# Patient Record
Sex: MALE | Race: WHITE | NOT HISPANIC OR LATINO | Employment: OTHER | URBAN - METROPOLITAN AREA
[De-identification: names, ages, dates, MRNs, and addresses within clinical notes are randomized per-mention and may not be internally consistent; named-entity substitution may affect disease eponyms.]

---

## 2017-02-09 ENCOUNTER — OFFICE VISIT (OUTPATIENT)
Dept: INTERNAL MEDICINE | Facility: CLINIC | Age: 72
End: 2017-02-09

## 2017-02-09 VITALS
WEIGHT: 152 LBS | RESPIRATION RATE: 16 BRPM | HEART RATE: 72 BPM | SYSTOLIC BLOOD PRESSURE: 152 MMHG | BODY MASS INDEX: 21.81 KG/M2 | DIASTOLIC BLOOD PRESSURE: 74 MMHG

## 2017-02-09 DIAGNOSIS — J06.9 URTI (ACUTE UPPER RESPIRATORY INFECTION): Primary | ICD-10-CM

## 2017-02-09 DIAGNOSIS — L85.3 DRY SKIN: ICD-10-CM

## 2017-02-09 PROCEDURE — 99214 OFFICE O/P EST MOD 30 MIN: CPT | Performed by: INTERNAL MEDICINE

## 2017-02-09 RX ORDER — AZITHROMYCIN 250 MG/1
TABLET, FILM COATED ORAL
Qty: 6 TABLET | Refills: 0 | Status: SHIPPED | OUTPATIENT
Start: 2017-02-09 | End: 2017-09-14

## 2017-02-09 NOTE — PROGRESS NOTES
Subjective   Kory Mcmanus is a 71 y.o. male.   Pt presents today with complaints of nasal congestion and nonproductive cough.             History of Present Illness   Pt presents today with nasal congestion and complaints of nonproductive cough. He states he has been fighting these symptoms since the end of December 2016. He states that his BP is controlled usually. He denies fever or chills.   He also complains o dry itching skin as he has done it appears at past visits. He states he washes his body with the same soap he washes his hair with?             The following portions of the patient's history were reviewed and updated as appropriate: allergies, current medications, past family history, past medical history, past social history, past surgical history and problem list.               Review of Systems   Constitutional: Negative.    HENT:        See HPI.    Eyes: Negative.    Respiratory:        See HPI.    Cardiovascular: Negative.    Gastrointestinal: Negative.    Endocrine: Negative.    Genitourinary: Negative.    Musculoskeletal: Negative.    Skin: Negative.    Allergic/Immunologic: Negative.    Neurological: Negative.    Hematological: Negative.    Psychiatric/Behavioral: Negative.                 Objective   Physical Exam   Constitutional: He is oriented to person, place, and time. He appears well-developed and well-nourished.   HENT:   Head: Normocephalic and atraumatic.   Right Ear: External ear normal.   Left Ear: External ear normal.   Nose: Nose normal.   Moderate oropharyngeal drainage.   Tenderness to palpation of maxillary sinuses.    Eyes: Conjunctivae and EOM are normal. Pupils are equal, round, and reactive to light.   Neck: Normal range of motion. Neck supple.   Cardiovascular: Normal rate, regular rhythm and normal heart sounds.    Pulmonary/Chest: Effort normal and breath sounds normal.   Abdominal: Soft. Bowel sounds are normal.   Neurological: He is alert and oriented to person, place, and  time. He has normal reflexes.   Skin: Skin is warm and dry.   Psychiatric: He has a normal mood and affect.   Nursing note and vitals reviewed.               Assessment/Plan      URTI (acute upper respiratory infection)  -     azithromycin (ZITHROMAX Z-INDIA) 250 MG tablet; Take 2 tablets the first day, then 1 tablet daily for 4 days.    Dry skin        1.) Nasal saline spray as directed   2.) Zpack as directed   3.) Also recommended Dove soap for sensitive skin to wash with and Cereve lotion prn, I did state it could be due to his anxiety as well.   4.) 15 minutes spent in exam and 10 minutes spent counseling on new medications how to dose and possible s/e.     * Total time spent in discussion and exam 25 minutes.

## 2017-03-13 ENCOUNTER — OFFICE VISIT (OUTPATIENT)
Dept: INTERNAL MEDICINE | Facility: CLINIC | Age: 72
End: 2017-03-13

## 2017-03-13 VITALS
HEART RATE: 68 BPM | BODY MASS INDEX: 21.81 KG/M2 | WEIGHT: 152 LBS | RESPIRATION RATE: 17 BRPM | DIASTOLIC BLOOD PRESSURE: 70 MMHG | SYSTOLIC BLOOD PRESSURE: 152 MMHG

## 2017-03-13 DIAGNOSIS — F41.1 GENERALIZED ANXIETY DISORDER: ICD-10-CM

## 2017-03-13 DIAGNOSIS — R05.9 COUGH: ICD-10-CM

## 2017-03-13 DIAGNOSIS — E78.2 MIXED HYPERLIPIDEMIA: Primary | ICD-10-CM

## 2017-03-13 DIAGNOSIS — Z79.899 DRUG THERAPY: ICD-10-CM

## 2017-03-13 DIAGNOSIS — I10 ESSENTIAL HYPERTENSION: ICD-10-CM

## 2017-03-13 DIAGNOSIS — N40.0 BENIGN NON-NODULAR PROSTATIC HYPERPLASIA WITHOUT LOWER URINARY TRACT SYMPTOMS: ICD-10-CM

## 2017-03-13 PROCEDURE — 99397 PER PM REEVAL EST PAT 65+ YR: CPT | Performed by: INTERNAL MEDICINE

## 2017-03-13 NOTE — PROGRESS NOTES
Subjective   Kory Mcmanus is a 72 y.o. male.     History of Present Illness   For annual physical and follow up of  Mild hyperlipidemia on no medication.  Anxiety is about the same.  Still has some cough and congestion which is aggravated by his daughters smoking.  HTN borderline on no meds.    The following portions of the patient's history were reviewed and updated as appropriate: allergies, current medications, past family history, past medical history, past social history, past surgical history and problem list.    Review of Systems   Constitutional: Negative for activity change, appetite change, fatigue and fever.   HENT: Negative for dental problem, ear pain, hearing loss, postnasal drip, rhinorrhea, sinus pressure, sore throat, trouble swallowing and voice change.    Eyes: Negative.    Respiratory: Positive for cough. Negative for chest tightness, shortness of breath and wheezing.    Cardiovascular: Negative for chest pain and leg swelling.   Gastrointestinal: Negative for abdominal distention, abdominal pain, blood in stool, constipation, diarrhea and nausea.   Endocrine: Negative for polydipsia and polyuria.   Genitourinary: Negative for decreased urine volume, dysuria, hematuria and urgency.   Musculoskeletal: Negative for arthralgias, back pain and neck pain.   Skin: Negative for pallor and rash.   Allergic/Immunologic: Negative.    Neurological: Negative for dizziness, tremors, speech difficulty, weakness, numbness and headaches.   Hematological: Negative for adenopathy.   Psychiatric/Behavioral: Negative for agitation, behavioral problems, confusion, dysphoric mood and sleep disturbance. The patient is nervous/anxious. The patient is not hyperactive.        Objective   Physical Exam   Constitutional: He is oriented to person, place, and time. He appears well-developed and well-nourished.   BP by me, 132/80.   HENT:   Head: Normocephalic and atraumatic.   Right Ear: External ear normal.   Left Ear:  External ear normal.   Nose: Nose normal.   Mouth/Throat: Oropharynx is clear and moist.   Eyes: Conjunctivae and EOM are normal. Pupils are equal, round, and reactive to light.   Fundoscopic exam:       The right eye shows no AV nicking and no hemorrhage.        The left eye shows no AV nicking and no hemorrhage.   Neck: Normal range of motion. Neck supple. No thyromegaly present.   Cardiovascular: Normal rate, regular rhythm, normal heart sounds and intact distal pulses.    No murmur heard.  Carotids normal.   Pulmonary/Chest: Effort normal and breath sounds normal.   Abdominal: Soft. Bowel sounds are normal. He exhibits no distension and no mass. There is no tenderness. No hernia.   Genitourinary: Rectum normal, testes normal and penis normal.   Genitourinary Comments: Prostate is 3+ and smooth.   Musculoskeletal: Normal range of motion.   Lymphadenopathy:     He has no cervical adenopathy.   Neurological: He is alert and oriented to person, place, and time. He has normal reflexes. No cranial nerve deficit.   Skin: Skin is warm and dry.   Psychiatric: He has a normal mood and affect. His behavior is normal. Judgment and thought content normal.   Nursing note and vitals reviewed.      Assessment/Plan   Kory was seen today for hypertension.    Diagnoses and all orders for this visit:    Mixed hyperlipidemia  -     Comprehensive Metabolic Panel; Future  -     Lipid Panel; Future    Essential hypertension    Generalized anxiety disorder    Benign non-nodular prostatic hyperplasia without lower urinary tract symptoms  -     PSA; Future    Drug therapy  -     CBC (No Diff); Future    Cough    Cough is probably multifactorial, from smoke and anxiety.]  Recommended otc mucinex.  Rest of problems are stable.  Counseled on diet and immunizations.  Health maintenance is up to date.  Recheck here 6 months.

## 2017-03-14 ENCOUNTER — TELEPHONE (OUTPATIENT)
Dept: INTERNAL MEDICINE | Facility: CLINIC | Age: 72
End: 2017-03-14

## 2017-03-14 NOTE — TELEPHONE ENCOUNTER
----- Message from June Osborne sent at 3/14/2017 11:58 AM EDT -----  [phone]  PT WAS IN YESTERDAY FOR A PHYSICAL AND PT HAS QUESTION ABOUT GOING ON ANTIBIOTIC   [facility]

## 2017-03-15 LAB
ALBUMIN SERPL-MCNC: 4.3 G/DL (ref 3.2–4.8)
ALBUMIN/GLOB SERPL: 1.3 G/DL (ref 1.5–2.5)
ALP SERPL-CCNC: 81 U/L (ref 25–100)
ALT SERPL-CCNC: 34 U/L (ref 7–40)
AST SERPL-CCNC: 30 U/L (ref 0–33)
BILIRUB SERPL-MCNC: 0.6 MG/DL (ref 0.3–1.2)
BUN SERPL-MCNC: 19 MG/DL (ref 9–23)
BUN/CREAT SERPL: 23.8 (ref 7–25)
CALCIUM SERPL-MCNC: 10.1 MG/DL (ref 8.7–10.4)
CHLORIDE SERPL-SCNC: 101 MMOL/L (ref 99–109)
CHOLEST SERPL-MCNC: 207 MG/DL (ref 0–200)
CO2 SERPL-SCNC: 26 MMOL/L (ref 20–31)
CREAT SERPL-MCNC: 0.8 MG/DL (ref 0.6–1.3)
ERYTHROCYTE [DISTWIDTH] IN BLOOD BY AUTOMATED COUNT: 13.8 % (ref 11.3–14.5)
GLOBULIN SER CALC-MCNC: 3.3 GM/DL
GLUCOSE SERPL-MCNC: 95 MG/DL (ref 70–100)
HCT VFR BLD AUTO: 43 % (ref 38.9–50.9)
HDLC SERPL-MCNC: 64 MG/DL (ref 40–60)
HGB BLD-MCNC: 14.3 G/DL (ref 13.1–17.5)
LDLC SERPL CALC-MCNC: 115 MG/DL (ref 0–100)
MCH RBC QN AUTO: 32 PG (ref 27–31)
MCHC RBC AUTO-ENTMCNC: 33.3 G/DL (ref 32–36)
MCV RBC AUTO: 96.2 FL (ref 80–99)
PLATELET # BLD AUTO: 310 10*3/MM3 (ref 150–450)
POTASSIUM SERPL-SCNC: 4 MMOL/L (ref 3.5–5.5)
PROT SERPL-MCNC: 7.6 G/DL (ref 5.7–8.2)
PSA SERPL-MCNC: 1.5 NG/ML (ref 0–4)
RBC # BLD AUTO: 4.47 10*6/MM3 (ref 4.2–5.76)
SODIUM SERPL-SCNC: 140 MMOL/L (ref 132–146)
TRIGL SERPL-MCNC: 140 MG/DL (ref 0–150)
VLDLC SERPL CALC-MCNC: 28 MG/DL
WBC # BLD AUTO: 5.13 10*3/MM3 (ref 3.5–10.8)

## 2017-03-17 NOTE — TELEPHONE ENCOUNTER
Patient returned our call and stated that he went to the dermatologist the following day received a steroid injection. States he decided at this time he did not want to try an antibiotic for his cough. Advised patient that he can return our call if symptoms persist. Patient verbalized understanding

## 2017-03-17 NOTE — TELEPHONE ENCOUNTER
Attempted to contact patient LVM for him to return call stating if he was still having concerns. Office number and hours given

## 2017-09-14 ENCOUNTER — OFFICE VISIT (OUTPATIENT)
Dept: INTERNAL MEDICINE | Facility: CLINIC | Age: 72
End: 2017-09-14

## 2017-09-14 VITALS
RESPIRATION RATE: 21 BRPM | HEART RATE: 70 BPM | WEIGHT: 153 LBS | BODY MASS INDEX: 21.9 KG/M2 | HEIGHT: 70 IN | DIASTOLIC BLOOD PRESSURE: 74 MMHG | SYSTOLIC BLOOD PRESSURE: 152 MMHG

## 2017-09-14 DIAGNOSIS — E78.2 MIXED HYPERLIPIDEMIA: ICD-10-CM

## 2017-09-14 DIAGNOSIS — I10 ESSENTIAL HYPERTENSION: Primary | ICD-10-CM

## 2017-09-14 DIAGNOSIS — N40.0 BENIGN NON-NODULAR PROSTATIC HYPERPLASIA WITHOUT LOWER URINARY TRACT SYMPTOMS: ICD-10-CM

## 2017-09-14 PROCEDURE — 99214 OFFICE O/P EST MOD 30 MIN: CPT | Performed by: INTERNAL MEDICINE

## 2017-09-14 RX ORDER — TAMSULOSIN HYDROCHLORIDE 0.4 MG/1
1 CAPSULE ORAL NIGHTLY
Qty: 30 CAPSULE | Refills: 5 | Status: SHIPPED | OUTPATIENT
Start: 2017-09-14 | End: 2018-03-27

## 2017-09-14 NOTE — PROGRESS NOTES
Subjective   Kory Mcmanus is a 72 y.o. male.     History of Present Illness   For follow up of  Borderline hypertension on no meds. No chest pain, sob or headaches.  BPH which bothers him a lot at night,  He is ready to try medication.  Hyperlipidemia on no meds.  He has some congestion when he lays down at night, but if he rolls over it goes away.    The following portions of the patient's history were reviewed and updated as appropriate: allergies, current medications, past medical history and problem list.    Review of Systems   Constitutional: Negative.    Respiratory: Negative.  Negative for cough, choking, chest tightness, shortness of breath and wheezing.    Cardiovascular: Negative.  Negative for chest pain, palpitations and leg swelling.   Gastrointestinal: Negative.  Negative for abdominal distention and abdominal pain.   Genitourinary: Positive for frequency.       Objective   Physical Exam   Constitutional: He appears well-developed and well-nourished.   /80 by me.     Neck: Normal range of motion. Neck supple.   Cardiovascular: Normal rate, regular rhythm, normal heart sounds and intact distal pulses.  Exam reveals no gallop and no friction rub.    No murmur heard.  Pulmonary/Chest: Effort normal and breath sounds normal. No respiratory distress. He has no wheezes. He has no rales. He exhibits no tenderness.   Abdominal: Soft. Bowel sounds are normal. He exhibits no distension and no mass. There is no tenderness.   Nursing note and vitals reviewed.      Assessment/Plan   Kory was seen today for hypertension.    Diagnoses and all orders for this visit:    Essential hypertension    Mixed hyperlipidemia    Benign non-nodular prostatic hyperplasia without lower urinary tract symptoms  -     tamsulosin (FLOMAX) 0.4 MG capsule 24 hr capsule; Take 1 capsule by mouth Every Night.    All problems are stable.  Will try flomax.  He is not interested in blood pressure med.  Continue same.   Recheck 6  months.

## 2018-03-27 ENCOUNTER — OFFICE VISIT (OUTPATIENT)
Dept: INTERNAL MEDICINE | Facility: CLINIC | Age: 73
End: 2018-03-27

## 2018-03-27 VITALS
SYSTOLIC BLOOD PRESSURE: 144 MMHG | HEIGHT: 70 IN | WEIGHT: 157 LBS | RESPIRATION RATE: 21 BRPM | DIASTOLIC BLOOD PRESSURE: 82 MMHG | HEART RATE: 86 BPM | BODY MASS INDEX: 22.48 KG/M2

## 2018-03-27 DIAGNOSIS — J41.0 CHRONIC BRONCHITIS, SIMPLE (HCC): Primary | ICD-10-CM

## 2018-03-27 DIAGNOSIS — R05.9 COUGH: ICD-10-CM

## 2018-03-27 DIAGNOSIS — E78.2 MIXED HYPERLIPIDEMIA: ICD-10-CM

## 2018-03-27 DIAGNOSIS — Z79.899 DRUG THERAPY: ICD-10-CM

## 2018-03-27 DIAGNOSIS — I10 ESSENTIAL HYPERTENSION: ICD-10-CM

## 2018-03-27 DIAGNOSIS — F41.1 GENERALIZED ANXIETY DISORDER: ICD-10-CM

## 2018-03-27 DIAGNOSIS — N40.0 BENIGN NON-NODULAR PROSTATIC HYPERPLASIA WITHOUT LOWER URINARY TRACT SYMPTOMS: ICD-10-CM

## 2018-03-27 PROCEDURE — 99397 PER PM REEVAL EST PAT 65+ YR: CPT | Performed by: INTERNAL MEDICINE

## 2018-03-27 RX ORDER — MONTELUKAST SODIUM 10 MG/1
10 TABLET ORAL NIGHTLY
Qty: 30 TABLET | Refills: 3 | Status: SHIPPED | OUTPATIENT
Start: 2018-03-27 | End: 2019-04-22

## 2018-03-27 NOTE — PROGRESS NOTES
Subjective   Kory Mcmanus is a 73 y.o. male.     History of Present Illness   For annual physical and follow up of  BPH.  Has to get up a lot at night to urinated but he goes to sleep right after he drinks after dinner.  Is not taking flomax.  Some cough and congestion with his chronic bronchitis.  Anxiety is about the same.  Borderline blood pressure, no headaches, chest pain or sob.    The following portions of the patient's history were reviewed and updated as appropriate: allergies, current medications, past family history, past medical history, past social history, past surgical history and problem list.    Review of Systems   Constitutional: Negative.  Negative for activity change, appetite change, fatigue and fever.   HENT: Positive for congestion. Negative for dental problem, ear pain, hearing loss, postnasal drip, rhinorrhea, sinus pressure, sore throat, trouble swallowing and voice change.    Eyes: Negative.  Negative for redness and visual disturbance.   Respiratory: Positive for cough. Negative for chest tightness and wheezing.    Cardiovascular: Negative.  Negative for chest pain, palpitations and leg swelling.   Gastrointestinal: Negative.  Negative for abdominal distention, abdominal pain, blood in stool, constipation, diarrhea and nausea.   Endocrine: Negative.  Negative for polydipsia and polyuria.   Genitourinary: Positive for frequency. Negative for decreased urine volume, dysuria, hematuria and urgency.   Musculoskeletal: Negative.  Negative for arthralgias, back pain and neck pain.   Skin: Negative.  Negative for pallor and rash.   Allergic/Immunologic: Negative.    Neurological: Negative.  Negative for dizziness, tremors, speech difficulty, weakness, numbness and headaches.   Hematological: Negative.  Negative for adenopathy.   Psychiatric/Behavioral: Negative for agitation, behavioral problems, confusion, dysphoric mood and sleep disturbance. The patient is nervous/anxious. The patient is not  hyperactive.        Objective   Physical Exam   Constitutional: He is oriented to person, place, and time. He appears well-developed and well-nourished.   HENT:   Head: Normocephalic and atraumatic.   Right Ear: External ear normal.   Left Ear: External ear normal.   Nose: Nose normal.   Mouth/Throat: Oropharynx is clear and moist.   Eyes: Conjunctivae and EOM are normal. Pupils are equal, round, and reactive to light.   Fundoscopic exam:       The right eye shows no AV nicking and no hemorrhage.        The left eye shows no AV nicking and no hemorrhage.   Neck: Normal range of motion. Neck supple. No thyromegaly present.   Cardiovascular: Normal rate, regular rhythm, normal heart sounds and intact distal pulses.  Exam reveals no gallop and no friction rub.    No murmur heard.  Carotids normal.   Pulmonary/Chest: Effort normal and breath sounds normal. No respiratory distress. He has no wheezes. He has no rales. He exhibits no tenderness.   Abdominal: Soft. Bowel sounds are normal. He exhibits no distension and no mass. There is no tenderness. No hernia.   Genitourinary: Rectum normal, testes normal and penis normal.   Genitourinary Comments: Prostate 3+ but smooth.   Musculoskeletal: Normal range of motion. He exhibits no edema.   Lymphadenopathy:     He has no cervical adenopathy.   Neurological: He is alert and oriented to person, place, and time. He has normal reflexes. No cranial nerve deficit.   Skin: Skin is warm and dry.   A lot of cracking on distal fingers.   Psychiatric: He has a normal mood and affect. His behavior is normal. Judgment and thought content normal.   Nursing note and vitals reviewed.      Assessment/Plan   Kory was seen today for essential hypertension.    Diagnoses and all orders for this visit:    Chronic bronchitis, simple  -     montelukast (SINGULAIR) 10 MG tablet; Take 1 tablet by mouth Every Night.    Mixed hyperlipidemia  -     Comprehensive Metabolic Panel; Future  -     Lipid  Panel; Future    Essential hypertension    Cough    Generalized anxiety disorder    Benign non-nodular prostatic hyperplasia without lower urinary tract symptoms  -     PSA Screen; Future    Drug therapy  -     CBC (No Diff); Future    Will try singulair.  He will try daily flomax again.  BP is borderline, he will watch his salt better.  Rest of problems are stable.  Labs as noted.  Counseled on diet and immunizations.  Health maintenance is up to date.  Same meds otherwise.  Recheck 6 months.

## 2018-04-12 ENCOUNTER — TELEPHONE (OUTPATIENT)
Dept: INTERNAL MEDICINE | Facility: CLINIC | Age: 73
End: 2018-04-12

## 2018-04-12 NOTE — TELEPHONE ENCOUNTER
4-12-18  S/w patient informed him of his blood work he stated will be in the next day or two to have drawn.  Very appreciative of the reminder.

## 2018-04-13 ENCOUNTER — LAB (OUTPATIENT)
Dept: INTERNAL MEDICINE | Facility: CLINIC | Age: 73
End: 2018-04-13

## 2018-04-13 DIAGNOSIS — Z79.899 DRUG THERAPY: ICD-10-CM

## 2018-04-13 DIAGNOSIS — E78.2 MIXED HYPERLIPIDEMIA: ICD-10-CM

## 2018-04-13 DIAGNOSIS — N40.0 BENIGN NON-NODULAR PROSTATIC HYPERPLASIA WITHOUT LOWER URINARY TRACT SYMPTOMS: ICD-10-CM

## 2018-04-13 LAB
ALBUMIN SERPL-MCNC: 4.4 G/DL (ref 3.2–4.8)
ALBUMIN/GLOB SERPL: 1.4 G/DL (ref 1.5–2.5)
ALP SERPL-CCNC: 94 U/L (ref 25–100)
ALT SERPL-CCNC: 36 U/L (ref 7–40)
AST SERPL-CCNC: 29 U/L (ref 0–33)
BILIRUB SERPL-MCNC: 0.8 MG/DL (ref 0.3–1.2)
BUN SERPL-MCNC: 21 MG/DL (ref 9–23)
BUN/CREAT SERPL: 26.3 (ref 7–25)
CALCIUM SERPL-MCNC: 9.5 MG/DL (ref 8.7–10.4)
CHLORIDE SERPL-SCNC: 100 MMOL/L (ref 99–109)
CHOLEST SERPL-MCNC: 189 MG/DL (ref 0–200)
CO2 SERPL-SCNC: 31 MMOL/L (ref 20–31)
CREAT SERPL-MCNC: 0.8 MG/DL (ref 0.6–1.3)
ERYTHROCYTE [DISTWIDTH] IN BLOOD BY AUTOMATED COUNT: 13.8 % (ref 11.3–14.5)
GFR SERPLBLD CREATININE-BSD FMLA CKD-EPI: 115 ML/MIN/1.73
GFR SERPLBLD CREATININE-BSD FMLA CKD-EPI: 94 ML/MIN/1.73
GLOBULIN SER CALC-MCNC: 3.1 GM/DL
GLUCOSE SERPL-MCNC: 99 MG/DL (ref 70–100)
HCT VFR BLD AUTO: 44.5 % (ref 38.9–50.9)
HDLC SERPL-MCNC: 63 MG/DL (ref 40–60)
HGB BLD-MCNC: 14.6 G/DL (ref 13.1–17.5)
LDLC SERPL CALC-MCNC: 110 MG/DL (ref 0–100)
MCH RBC QN AUTO: 31.9 PG (ref 27–31)
MCHC RBC AUTO-ENTMCNC: 32.8 G/DL (ref 32–36)
MCV RBC AUTO: 97.2 FL (ref 80–99)
PLATELET # BLD AUTO: 346 10*3/MM3 (ref 150–450)
POTASSIUM SERPL-SCNC: 4.4 MMOL/L (ref 3.5–5.5)
PROT SERPL-MCNC: 7.5 G/DL (ref 5.7–8.2)
PSA SERPL-MCNC: 1.75 NG/ML (ref 0–4)
RBC # BLD AUTO: 4.58 10*6/MM3 (ref 4.2–5.76)
SODIUM SERPL-SCNC: 138 MMOL/L (ref 132–146)
TRIGL SERPL-MCNC: 82 MG/DL (ref 0–150)
VLDLC SERPL CALC-MCNC: 16.4 MG/DL
WBC # BLD AUTO: 8.37 10*3/MM3 (ref 3.5–10.8)

## 2018-04-13 PROCEDURE — 36415 COLL VENOUS BLD VENIPUNCTURE: CPT | Performed by: INTERNAL MEDICINE

## 2018-06-01 ENCOUNTER — OFFICE VISIT (OUTPATIENT)
Dept: INTERNAL MEDICINE | Facility: CLINIC | Age: 73
End: 2018-06-01

## 2018-06-01 VITALS
DIASTOLIC BLOOD PRESSURE: 80 MMHG | OXYGEN SATURATION: 98 % | BODY MASS INDEX: 22.48 KG/M2 | TEMPERATURE: 97.8 F | RESPIRATION RATE: 16 BRPM | WEIGHT: 157 LBS | SYSTOLIC BLOOD PRESSURE: 140 MMHG | HEART RATE: 81 BPM | HEIGHT: 70 IN

## 2018-06-01 DIAGNOSIS — H00.015 HORDEOLUM EXTERNUM LEFT LOWER EYELID: Primary | ICD-10-CM

## 2018-06-01 DIAGNOSIS — H01.001 BLEPHARITIS OF UPPER EYELIDS OF BOTH EYES, UNSPECIFIED TYPE: ICD-10-CM

## 2018-06-01 DIAGNOSIS — H01.004 BLEPHARITIS OF UPPER EYELIDS OF BOTH EYES, UNSPECIFIED TYPE: ICD-10-CM

## 2018-06-01 PROCEDURE — 99213 OFFICE O/P EST LOW 20 MIN: CPT | Performed by: PHYSICIAN ASSISTANT

## 2018-06-01 NOTE — PROGRESS NOTES
Chief Complaint   Patient presents with   • Stye     L eye pain, 3 days ago, eye lid started swelling. R eye starting to get a little achey.       Subjective       History of Present Illness     Kory Mcmanus is a 73 y.o. male. He presents with 3 day history of pain beneath his left eye and some swelling around the eye. Now having some symptoms in R eye as well. Patient states that this began with a little soreness under left eye and felt like his eyelid was drooping a little because of some swelling. He noticed some dry skin above eye as well. Patient feels like right eye is having some similar symptoms with drooping of eyelid beginning yesterday. Patient denies any eye involvement, only skin around eyes/ eyelids. He denies blurry vision or spot in his vision, itchy or watery eyes, eye redness, nasal congestion or runny nose, ear pain, sore throat, headache, rash or any additional symptoms. Patient did use warm compress on L eye last night and this appeared to improve his symptoms. He also uses OTC eye drops for dry eyes and to remove particulates PRN after working. Eye drops have provided no relief. Patient does note that he uses same towel to wipe his face/ eyes throughout the day when working, often for days/ week at a time which may be exacerbating symptoms.       The following portions of the patient's history were reviewed and updated as appropriate: allergies, current medications, past medical history, past social history and problem list.    Allergies   Allergen Reactions   • Amoxicillin    • Clindamycin      Social History   Substance Use Topics   • Smoking status: Former Smoker   • Smokeless tobacco: Not on file   • Alcohol use Yes      Comment: 1-2 drinks daily         Current Outpatient Prescriptions:   •  aspirin (BAILEE ADVANCED ASPIRIN REG ST) 325 MG tablet, Take 1 tablet by mouth 3 (three) times a day., Disp: , Rfl:   •  montelukast (SINGULAIR) 10 MG tablet, Take 1 tablet by mouth Every Night., Disp: 30  tablet, Rfl: 3    Review of Systems   Constitutional: Negative for chills, fatigue and fever.   HENT: Negative for congestion, ear pain and sore throat.    Eyes: Negative for blurred vision, pain, discharge, redness and itching.        +pain below left eye; +swelling of eyelids bilaterally   Respiratory: Negative for cough, shortness of breath and wheezing.    Cardiovascular: Negative for chest pain and palpitations.   Gastrointestinal: Negative for abdominal pain, diarrhea, nausea and vomiting.   Genitourinary: Negative for dysuria and hematuria.   Skin: Negative for rash.   Neurological: Negative for dizziness, weakness, light-headedness and headache.       Objective   Vitals:    06/01/18 1533   BP: 140/80   Pulse: 81   Resp: 16   Temp: 97.8 °F (36.6 °C)   SpO2: 98%     Physical Exam   Constitutional: He appears well-developed and well-nourished.   HENT:   Head: Normocephalic and atraumatic.   Right Ear: Tympanic membrane, external ear and ear canal normal.   Left Ear: Tympanic membrane, external ear and ear canal normal.   Nose: Nose normal.   Mouth/Throat: Oropharynx is clear and moist and mucous membranes are normal.   Eyes: Conjunctivae and EOM are normal. Pupils are equal, round, and reactive to light. Right eye exhibits no discharge. No foreign body present in the right eye. Left eye exhibits hordeolum. Left eye exhibits no discharge. No foreign body present in the left eye. No scleral icterus.   Fundoscopic exam:       The right eye shows no hemorrhage.        The left eye shows no hemorrhage.   +blepharitis of eyelids bilaterally, L > R. +minimal swelling and erythema of L lower eyelid slightly lateral to midline indicative of early stye. +tenderness to palpation of L lower eyelid. +minimal  erythema and edema of R upper eyelid noted. No tenderness to palpation of R orbit or R lower lid.    Cardiovascular: Normal rate and regular rhythm.    Pulmonary/Chest: Effort normal and breath sounds normal.    Lymphadenopathy:     He has no cervical adenopathy.       Assessment/Plan   Kory was seen today for stye.    Diagnoses and all orders for this visit:    Hordeolum externum left lower eyelid    Blepharitis of upper eyelids of both eyes, unspecified type    Patient advised to use warm compress on both eyes 2 times a day at least for the next week, or until symptoms resolve. Also use Brijesh's baby shampoo or other no-tear shampoo formula on soft cloth to gently cleanse eyelids and around eyes at least once daily.   Good eye/ eyelid hygiene encouraged. Do not reuse towels and wash facial towels often.          Return if symptoms worsen or fail to improve.

## 2018-09-27 ENCOUNTER — OFFICE VISIT (OUTPATIENT)
Dept: INTERNAL MEDICINE | Facility: CLINIC | Age: 73
End: 2018-09-27

## 2018-09-27 VITALS
RESPIRATION RATE: 16 BRPM | BODY MASS INDEX: 22.67 KG/M2 | TEMPERATURE: 97.8 F | WEIGHT: 158 LBS | OXYGEN SATURATION: 98 % | DIASTOLIC BLOOD PRESSURE: 82 MMHG | HEART RATE: 83 BPM | SYSTOLIC BLOOD PRESSURE: 140 MMHG

## 2018-09-27 DIAGNOSIS — E78.2 MIXED HYPERLIPIDEMIA: ICD-10-CM

## 2018-09-27 DIAGNOSIS — J41.0 CHRONIC BRONCHITIS, SIMPLE (HCC): ICD-10-CM

## 2018-09-27 DIAGNOSIS — I10 ESSENTIAL HYPERTENSION: Primary | ICD-10-CM

## 2018-09-27 DIAGNOSIS — F41.1 GENERALIZED ANXIETY DISORDER: ICD-10-CM

## 2018-09-27 DIAGNOSIS — Z91.89 AT RISK FOR HEPATITIS: ICD-10-CM

## 2018-09-27 PROCEDURE — 99214 OFFICE O/P EST MOD 30 MIN: CPT | Performed by: INTERNAL MEDICINE

## 2018-09-27 PROCEDURE — 90471 IMMUNIZATION ADMIN: CPT | Performed by: INTERNAL MEDICINE

## 2018-09-27 PROCEDURE — 90632 HEPA VACCINE ADULT IM: CPT | Performed by: INTERNAL MEDICINE

## 2018-09-27 NOTE — PROGRESS NOTES
Subjective   Kory Mcmanus is a 73 y.o. male.     History of Present Illness   For follow up of  Borderline hypertension on no meds, no chest pain sob or headaches.  Hyperlipidemia on no meds.  Anxiety is stable.  Mild chronic bronchitis is good on singulair.    The following portions of the patient's history were reviewed and updated as appropriate: allergies, current medications, past medical history and problem list.    Review of Systems   Constitutional: Negative.    Respiratory: Negative.  Negative for cough, chest tightness, shortness of breath and wheezing.    Cardiovascular: Negative.  Negative for chest pain, palpitations and leg swelling.   Gastrointestinal: Negative.  Negative for abdominal distention and abdominal pain.       Objective   Physical Exam   Constitutional: He appears well-developed and well-nourished.   135/80 by me.   Cardiovascular: Normal rate, regular rhythm and normal heart sounds.  Exam reveals no gallop and no friction rub.    No murmur heard.  Pulmonary/Chest: Effort normal and breath sounds normal. No respiratory distress. He has no wheezes. He has no rales. He exhibits no tenderness.   Abdominal: Soft. Bowel sounds are normal. He exhibits no distension and no mass. There is no tenderness. There is no guarding.   Neurological: He is alert.   Nursing note and vitals reviewed.        Assessment/Plan   Kory was seen today for hypertension.    Diagnoses and all orders for this visit:    Essential hypertension    Mixed hyperlipidemia    At risk for hepatitis  -     Hepatitis A Vaccine Adult IM    Generalized anxiety disorder    Chronic bronchitis, simple (CMS/HCC)    All problems are stable.  Same meds.  Recheck 6 months.

## 2018-10-05 ENCOUNTER — TELEPHONE (OUTPATIENT)
Dept: INTERNAL MEDICINE | Facility: CLINIC | Age: 73
End: 2018-10-05

## 2018-10-05 NOTE — TELEPHONE ENCOUNTER
----- Message from Adrienne Loo sent at 10/4/2018  2:27 PM EDT -----  PATIENT CALLED AND IS REQUESTING A CALL BACK. PATIENT WOULD NOT BE SPECIFIC REQUESTED TO SPEAK ONLY TO DR. KENNEY OR HIS NURSE.    PATIENT CONTACT: [phone]    THANK YOU.

## 2018-10-12 ENCOUNTER — TELEPHONE (OUTPATIENT)
Dept: INTERNAL MEDICINE | Facility: CLINIC | Age: 73
End: 2018-10-12

## 2019-04-22 ENCOUNTER — OFFICE VISIT (OUTPATIENT)
Dept: INTERNAL MEDICINE | Facility: CLINIC | Age: 74
End: 2019-04-22

## 2019-04-22 VITALS
WEIGHT: 158 LBS | HEART RATE: 82 BPM | SYSTOLIC BLOOD PRESSURE: 158 MMHG | RESPIRATION RATE: 21 BRPM | DIASTOLIC BLOOD PRESSURE: 72 MMHG | BODY MASS INDEX: 22.62 KG/M2 | HEIGHT: 70 IN

## 2019-04-22 DIAGNOSIS — F41.1 GENERALIZED ANXIETY DISORDER: ICD-10-CM

## 2019-04-22 DIAGNOSIS — Z00.00 HEALTH MAINTENANCE EXAMINATION: ICD-10-CM

## 2019-04-22 DIAGNOSIS — I10 ESSENTIAL HYPERTENSION: Primary | ICD-10-CM

## 2019-04-22 DIAGNOSIS — N40.0 BENIGN NON-NODULAR PROSTATIC HYPERPLASIA WITHOUT LOWER URINARY TRACT SYMPTOMS: ICD-10-CM

## 2019-04-22 DIAGNOSIS — E78.2 MIXED HYPERLIPIDEMIA: ICD-10-CM

## 2019-04-22 PROCEDURE — 99397 PER PM REEVAL EST PAT 65+ YR: CPT | Performed by: INTERNAL MEDICINE

## 2019-04-22 NOTE — PROGRESS NOTES
Subjective   Kory Mcmanus is a 74 y.o. male.     History of Present Illness   For annual and follow up of  HTN on no med, no headaches, sob or chest pain.  BPH, still frequency but no different.      The following portions of the patient's history were reviewed and updated as appropriate: allergies, current medications, past family history, past medical history, past social history, past surgical history and problem list.    Review of Systems   Constitutional: Negative.  Negative for activity change, appetite change, fatigue and fever.   HENT: Negative.  Negative for dental problem, ear pain, hearing loss, postnasal drip, rhinorrhea, sinus pressure, sore throat, trouble swallowing and voice change.    Eyes: Negative.  Negative for redness and visual disturbance.   Respiratory: Negative.  Negative for cough, chest tightness, shortness of breath and wheezing.    Cardiovascular: Negative.  Negative for chest pain, palpitations and leg swelling.   Gastrointestinal: Negative.  Negative for abdominal distention, abdominal pain, blood in stool, constipation, diarrhea and nausea.   Endocrine: Negative.  Negative for polydipsia and polyuria.   Genitourinary: Negative.  Negative for decreased urine volume, dysuria, hematuria and urgency.   Musculoskeletal: Negative.  Negative for arthralgias, back pain and neck pain.   Skin: Negative.  Negative for pallor and rash.   Allergic/Immunologic: Negative.    Neurological: Negative.  Negative for dizziness, tremors, speech difficulty, weakness, numbness and confusion.   Hematological: Negative.  Negative for adenopathy.   Psychiatric/Behavioral: Negative.  Negative for agitation, behavioral problems, dysphoric mood, sleep disturbance, negative for hyperactivity and depressed mood. The patient is not nervous/anxious.        Objective   Physical Exam   Constitutional: He is oriented to person, place, and time. He appears well-developed and well-nourished.   HENT:   Head: Normocephalic  and atraumatic.   Right Ear: External ear normal.   Left Ear: External ear normal.   Nose: Nose normal.   Mouth/Throat: Oropharynx is clear and moist.   Eyes: Conjunctivae and EOM are normal. Pupils are equal, round, and reactive to light.   Fundoscopic exam:       The right eye shows no AV nicking and no hemorrhage.        The left eye shows no AV nicking and no hemorrhage.   Neck: Normal range of motion. Neck supple. No thyromegaly present.   Cardiovascular: Normal rate, regular rhythm, normal heart sounds and intact distal pulses. Exam reveals no gallop and no friction rub.   No murmur heard.  Carotids normal.   Pulmonary/Chest: Effort normal and breath sounds normal. No respiratory distress. He has no wheezes. He has no rales. He exhibits no tenderness.   Abdominal: Soft. Bowel sounds are normal. He exhibits no distension and no mass. There is no tenderness. No hernia.   Genitourinary: Rectum normal, testes normal and penis normal.   Genitourinary Comments: Prostate 3+ but smooth.   Musculoskeletal: Normal range of motion. He exhibits no edema.   Lymphadenopathy:     He has no cervical adenopathy.   Neurological: He is alert and oriented to person, place, and time. He has normal reflexes. No cranial nerve deficit.   Skin: Skin is warm and dry.   Psychiatric: He has a normal mood and affect. His behavior is normal. Judgment and thought content normal.   Nursing note and vitals reviewed.        Assessment/Plan   Kory was seen today for essential hypertension.    Diagnoses and all orders for this visit:    Essential hypertension  Blood pressure is a bit too high but he refuses medication.    Mixed hyperlipidemia  -     Comprehensive Metabolic Panel  -     Lipid Panel    Benign non-nodular prostatic hyperplasia without lower urinary tract symptoms  Stable, no change.    Generalized anxiety disorder  Stable, no change.    Health maintenance examination  -     CBC (No Diff)  -     PSA Screen    All problems are  stable.  Labs as noted.  Counseled on diet and immunizatins.  Health maintenance is up to date.  Continue same.  Recheck 6 months.

## 2019-04-24 LAB
ALBUMIN SERPL-MCNC: 4.6 G/DL (ref 3.5–5.2)
ALBUMIN/GLOB SERPL: 1.5 G/DL
ALP SERPL-CCNC: 79 U/L (ref 39–117)
ALT SERPL-CCNC: 23 U/L (ref 1–41)
AST SERPL-CCNC: 20 U/L (ref 1–40)
BILIRUB SERPL-MCNC: 0.6 MG/DL (ref 0.2–1.2)
BUN SERPL-MCNC: 18 MG/DL (ref 8–23)
BUN/CREAT SERPL: 21.7 (ref 7–25)
CALCIUM SERPL-MCNC: 10 MG/DL (ref 8.6–10.5)
CHLORIDE SERPL-SCNC: 101 MMOL/L (ref 98–107)
CHOLEST SERPL-MCNC: 183 MG/DL (ref 0–200)
CO2 SERPL-SCNC: 27.9 MMOL/L (ref 22–29)
CREAT SERPL-MCNC: 0.83 MG/DL (ref 0.76–1.27)
ERYTHROCYTE [DISTWIDTH] IN BLOOD BY AUTOMATED COUNT: 13.3 % (ref 12.3–15.4)
GLOBULIN SER CALC-MCNC: 3.1 GM/DL
GLUCOSE SERPL-MCNC: 98 MG/DL (ref 65–99)
HCT VFR BLD AUTO: 46 % (ref 37.5–51)
HDLC SERPL-MCNC: 60 MG/DL (ref 40–60)
HGB BLD-MCNC: 15.3 G/DL (ref 13–17.7)
LDLC SERPL CALC-MCNC: 106 MG/DL (ref 0–100)
MCH RBC QN AUTO: 33 PG (ref 26.6–33)
MCHC RBC AUTO-ENTMCNC: 33.3 G/DL (ref 31.5–35.7)
MCV RBC AUTO: 99.4 FL (ref 79–97)
PLATELET # BLD AUTO: 342 10*3/MM3 (ref 140–450)
POTASSIUM SERPL-SCNC: 4.5 MMOL/L (ref 3.5–5.2)
PROT SERPL-MCNC: 7.7 G/DL (ref 6–8.5)
PSA SERPL-MCNC: 1.75 NG/ML (ref 0–4)
RBC # BLD AUTO: 4.63 10*6/MM3 (ref 4.14–5.8)
SODIUM SERPL-SCNC: 141 MMOL/L (ref 136–145)
TRIGL SERPL-MCNC: 86 MG/DL (ref 0–150)
VLDLC SERPL CALC-MCNC: 17.2 MG/DL
WBC # BLD AUTO: 6.2 10*3/MM3 (ref 3.4–10.8)

## 2019-09-26 ENCOUNTER — TELEPHONE (OUTPATIENT)
Dept: INTERNAL MEDICINE | Facility: CLINIC | Age: 74
End: 2019-09-26

## 2019-09-26 ENCOUNTER — OFFICE VISIT (OUTPATIENT)
Dept: INTERNAL MEDICINE | Facility: CLINIC | Age: 74
End: 2019-09-26

## 2019-09-26 VITALS
OXYGEN SATURATION: 94 % | DIASTOLIC BLOOD PRESSURE: 86 MMHG | SYSTOLIC BLOOD PRESSURE: 156 MMHG | BODY MASS INDEX: 22.56 KG/M2 | WEIGHT: 157.6 LBS | TEMPERATURE: 97.8 F | HEART RATE: 63 BPM | RESPIRATION RATE: 20 BRPM | HEIGHT: 70 IN

## 2019-09-26 DIAGNOSIS — N40.0 BENIGN NON-NODULAR PROSTATIC HYPERPLASIA WITHOUT LOWER URINARY TRACT SYMPTOMS: ICD-10-CM

## 2019-09-26 DIAGNOSIS — J41.0 CHRONIC BRONCHITIS, SIMPLE (HCC): ICD-10-CM

## 2019-09-26 DIAGNOSIS — I10 ESSENTIAL HYPERTENSION: ICD-10-CM

## 2019-09-26 DIAGNOSIS — E78.2 MIXED HYPERLIPIDEMIA: Primary | ICD-10-CM

## 2019-09-26 DIAGNOSIS — F41.1 GENERALIZED ANXIETY DISORDER: ICD-10-CM

## 2019-09-26 DIAGNOSIS — Z01.818 PRE-OP EXAM: ICD-10-CM

## 2019-09-26 LAB
BILIRUB BLD-MCNC: NEGATIVE MG/DL
CLARITY, POC: CLEAR
COLOR UR: YELLOW
EXPIRATION DATE: NORMAL
GLUCOSE UR STRIP-MCNC: NEGATIVE MG/DL
KETONES UR QL: NEGATIVE
LEUKOCYTE EST, POC: NEGATIVE
Lab: NORMAL
NITRITE UR-MCNC: NEGATIVE MG/ML
PH UR: 7 [PH] (ref 5–8)
PROT UR STRIP-MCNC: NEGATIVE MG/DL
RBC # UR STRIP: NEGATIVE /UL
SP GR UR: 1.01 (ref 1–1.03)
UROBILINOGEN UR QL: NORMAL

## 2019-09-26 PROCEDURE — 99214 OFFICE O/P EST MOD 30 MIN: CPT | Performed by: NURSE PRACTITIONER

## 2019-09-26 PROCEDURE — 81003 URINALYSIS AUTO W/O SCOPE: CPT | Performed by: NURSE PRACTITIONER

## 2019-09-26 NOTE — TELEPHONE ENCOUNTER
Called and spoke to  with [facility]. Was transferred to Surgical Coordinator. No answer Left voicemail message that pt was seen in office by provider and that pt continues to refuse treatment for HTN. Wanted surgeon to be aware of this prior to surgery. Office number given for call back

## 2019-09-26 NOTE — PATIENT INSTRUCTIONS
"      DASH Eating Plan  DASH stands for \"Dietary Approaches to Stop Hypertension.\" The DASH eating plan is a healthy eating plan that has been shown to reduce high blood pressure (hypertension). It may also reduce your risk for type 2 diabetes, heart disease, and stroke. The DASH eating plan may also help with weight loss.  What are tips for following this plan?    General guidelines  · Avoid eating more than 2,300 mg (milligrams) of salt (sodium) a day. If you have hypertension, you may need to reduce your sodium intake to 1,500 mg a day.  · Limit alcohol intake to no more than 1 drink a day for nonpregnant women and 2 drinks a day for men. One drink equals 12 oz of beer, 5 oz of wine, or 1½ oz of hard liquor.  · Work with your health care provider to maintain a healthy body weight or to lose weight. Ask what an ideal weight is for you.  · Get at least 30 minutes of exercise that causes your heart to beat faster (aerobic exercise) most days of the week. Activities may include walking, swimming, or biking.  · Work with your health care provider or diet and nutrition specialist (dietitian) to adjust your eating plan to your individual calorie needs.  Reading food labels    · Check food labels for the amount of sodium per serving. Choose foods with less than 5 percent of the Daily Value of sodium. Generally, foods with less than 300 mg of sodium per serving fit into this eating plan.  · To find whole grains, look for the word \"whole\" as the first word in the ingredient list.  Shopping  · Buy products labeled as \"low-sodium\" or \"no salt added.\"  · Buy fresh foods. Avoid canned foods and premade or frozen meals.  Cooking  · Avoid adding salt when cooking. Use salt-free seasonings or herbs instead of table salt or sea salt. Check with your health care provider or pharmacist before using salt substitutes.  · Do not oakes foods. Cook foods using healthy methods such as baking, boiling, grilling, and broiling instead.  · Cook " with heart-healthy oils, such as olive, canola, soybean, or sunflower oil.  Meal planning  · Eat a balanced diet that includes:  ? 5 or more servings of fruits and vegetables each day. At each meal, try to fill half of your plate with fruits and vegetables.  ? Up to 6-8 servings of whole grains each day.  ? Less than 6 oz of lean meat, poultry, or fish each day. A 3-oz serving of meat is about the same size as a deck of cards. One egg equals 1 oz.  ? 2 servings of low-fat dairy each day.  ? A serving of nuts, seeds, or beans 5 times each week.  ? Heart-healthy fats. Healthy fats called Omega-3 fatty acids are found in foods such as flaxseeds and coldwater fish, like sardines, salmon, and mackerel.  · Limit how much you eat of the following:  ? Canned or prepackaged foods.  ? Food that is high in trans fat, such as fried foods.  ? Food that is high in saturated fat, such as fatty meat.  ? Sweets, desserts, sugary drinks, and other foods with added sugar.  ? Full-fat dairy products.  · Do not salt foods before eating.  · Try to eat at least 2 vegetarian meals each week.  · Eat more home-cooked food and less restaurant, buffet, and fast food.  · When eating at a restaurant, ask that your food be prepared with less salt or no salt, if possible.  What foods are recommended?  The items listed may not be a complete list. Talk with your dietitian about what dietary choices are best for you.  Grains  Whole-grain or whole-wheat bread. Whole-grain or whole-wheat pasta. Brown rice. Oatmeal. Quinoa. Bulgur. Whole-grain and low-sodium cereals. Jackelyn bread. Low-fat, low-sodium crackers. Whole-wheat flour tortillas.  Vegetables  Fresh or frozen vegetables (raw, steamed, roasted, or grilled). Low-sodium or reduced-sodium tomato and vegetable juice. Low-sodium or reduced-sodium tomato sauce and tomato paste. Low-sodium or reduced-sodium canned vegetables.  Fruits  All fresh, dried, or frozen fruit. Canned fruit in natural juice  (without added sugar).  Meat and other protein foods  Skinless chicken or turkey. Ground chicken or turkey. Pork with fat trimmed off. Fish and seafood. Egg whites. Dried beans, peas, or lentils. Unsalted nuts, nut butters, and seeds. Unsalted canned beans. Lean cuts of beef with fat trimmed off. Low-sodium, lean deli meat.  Dairy  Low-fat (1%) or fat-free (skim) milk. Fat-free, low-fat, or reduced-fat cheeses. Nonfat, low-sodium ricotta or cottage cheese. Low-fat or nonfat yogurt. Low-fat, low-sodium cheese.  Fats and oils  Soft margarine without trans fats. Vegetable oil. Low-fat, reduced-fat, or light mayonnaise and salad dressings (reduced-sodium). Canola, safflower, olive, soybean, and sunflower oils. Avocado.  Seasoning and other foods  Herbs. Spices. Seasoning mixes without salt. Unsalted popcorn and pretzels. Fat-free sweets.  What foods are not recommended?  The items listed may not be a complete list. Talk with your dietitian about what dietary choices are best for you.  Grains  Baked goods made with fat, such as croissants, muffins, or some breads. Dry pasta or rice meal packs.  Vegetables  Creamed or fried vegetables. Vegetables in a cheese sauce. Regular canned vegetables (not low-sodium or reduced-sodium). Regular canned tomato sauce and paste (not low-sodium or reduced-sodium). Regular tomato and vegetable juice (not low-sodium or reduced-sodium). Pickles. Olives.  Fruits  Canned fruit in a light or heavy syrup. Fried fruit. Fruit in cream or butter sauce.  Meat and other protein foods  Fatty cuts of meat. Ribs. Fried meat. Alejandra. Sausage. Bologna and other processed lunch meats. Salami. Fatback. Hotdogs. Bratwurst. Salted nuts and seeds. Canned beans with added salt. Canned or smoked fish. Whole eggs or egg yolks. Chicken or turkey with skin.  Dairy  Whole or 2% milk, cream, and half-and-half. Whole or full-fat cream cheese. Whole-fat or sweetened yogurt. Full-fat cheese. Nondairy creamers. Whipped  toppings. Processed cheese and cheese spreads.  Fats and oils  Butter. Stick margarine. Lard. Shortening. Ghee. Alejandra fat. Tropical oils, such as coconut, palm kernel, or palm oil.  Seasoning and other foods  Salted popcorn and pretzels. Onion salt, garlic salt, seasoned salt, table salt, and sea salt. Worcestershire sauce. Tartar sauce. Barbecue sauce. Teriyaki sauce. Soy sauce, including reduced-sodium. Steak sauce. Canned and packaged gravies. Fish sauce. Oyster sauce. Cocktail sauce. Horseradish that you find on the shelf. Ketchup. Mustard. Meat flavorings and tenderizers. Bouillon cubes. Hot sauce and Tabasco sauce. Premade or packaged marinades. Premade or packaged taco seasonings. Relishes. Regular salad dressings.  Where to find more information:  · National Heart, Lung, and Blood [place]: www.nhlbi.nih.gov  · American Heart Association: www.heart.org  Summary  · The DASH eating plan is a healthy eating plan that has been shown to reduce high blood pressure (hypertension). It may also reduce your risk for type 2 diabetes, heart disease, and stroke.  · With the DASH eating plan, you should limit salt (sodium) intake to 2,300 mg a day. If you have hypertension, you may need to reduce your sodium intake to 1,500 mg a day.  · When on the DASH eating plan, aim to eat more fresh fruits and vegetables, whole grains, lean proteins, low-fat dairy, and heart-healthy fats.  · Work with your health care provider or diet and nutrition specialist (dietitian) to adjust your eating plan to your individual calorie needs.  This information is not intended to replace advice given to you by your health care provider. Make sure you discuss any questions you have with your health care provider.  Document Released: 12/06/2012 Document Revised: 12/11/2017 Document Reviewed: 12/11/2017  [employer] Interactive Patient Education © 2019 [employer]

## 2019-09-26 NOTE — PROGRESS NOTES
Chief Complaint   Patient presents with   • Pre-op Exam     Cataract surgery 10/2/19 Left eye        Subjective     History of Present Illness         Patient comes clinic today for preop visit and is due for chronic follow-up visit.    Patient is due to be in a patient of Dr. Levy.    Patient has hyperlipidemia.  No medications taken for hyperlipidemia.  Diet controlled.    Patient has hypertension currently not treated with medication.  Last 2 visits ablations blood pressure was elevated.  Patient does have regular strength Michelle aspirin he takes daily??    Patient has history of chronic bronchitis symptoms    Patient has  prostatic hypertrophy    Patient has generalized anxiety disorder    etoh use- 1 beer and or 2 glasses or wine per day.  No tobacco use.            The following portions of the patient's history were reviewed and updated as appropriate: allergies, current medications, past family history, past medical history, past social history, past surgical history and problem list.    Review of Systems   Constitutional: Negative for appetite change, chills, fatigue, fever, unexpected weight gain and unexpected weight loss.        No night sweats.     HENT: Negative for congestion, ear pain, facial swelling, hearing loss, nosebleeds, postnasal drip, rhinorrhea, sinus pressure, sneezing, sore throat, tinnitus, trouble swallowing and voice change.         Denies snoring.   Eyes: Negative for photophobia, pain, discharge, redness, itching and visual disturbance.   Respiratory: Negative for cough, chest tightness, shortness of breath and wheezing.         No chest congestion.  No hemoptysis.    Cardiovascular: Negative for chest pain, palpitations and leg swelling.        No orthopnea, CAN, or PND.  No claudication or syncope.   Gastrointestinal: Negative for abdominal distention, abdominal pain, blood in stool, constipation, diarrhea, nausea and vomiting.        No melena, heartburn, odynophagia,  dysphagia, early satiety,  belching, or bloating.   Endocrine: Negative for cold intolerance, heat intolerance, polydipsia, polyphagia and polyuria.        No hair loss or dry skin.    Genitourinary: Negative for decreased urine volume, difficulty urinating, discharge, dysuria, flank pain, frequency, hematuria, nocturia, erectile dysfunction, scrotal swelling, testicular pain, urgency and urinary incontinence.        No incomplete emptying.   Musculoskeletal: Negative for arthralgias, back pain, gait problem, joint swelling, myalgias, neck pain and neck stiffness.        No joint stiffness.   Skin: Negative for rash and skin lesions.   Neurological: Negative for dizziness, tremors, speech difficulty, weakness, light-headedness, numbness, headache, memory problem and confusion.        No tingling.    Hematological: Negative for adenopathy. Does not bruise/bleed easily.   Psychiatric/Behavioral: Negative for decreased concentration, sleep disturbance, suicidal ideas and depressed mood. The patient is not nervous/anxious.        Objective   Physical Exam   Constitutional: He is oriented to person, place, and time. He appears well-developed and well-nourished.   HENT:   Head: Normocephalic and atraumatic.   Right Ear: External ear normal.   Left Ear: External ear normal.   Nose: Nose normal.   Mouth/Throat: Oropharynx is clear and moist.   Eyes: Conjunctivae are normal. Pupils are equal, round, and reactive to light. Right eye exhibits no discharge. Left eye exhibits no discharge. No scleral icterus.   Neck: Normal range of motion. Carotid bruit is not present. No thyromegaly present.   Cardiovascular: Normal rate, regular rhythm, normal heart sounds and intact distal pulses. Exam reveals no gallop and no friction rub.   No murmur heard.  Pulmonary/Chest: Effort normal and breath sounds normal.   Abdominal: Soft. Bowel sounds are normal. He exhibits no distension and no mass. There is no tenderness. There is no rebound  and no guarding. No hernia.   Musculoskeletal: Normal range of motion. He exhibits no edema.   Lymphadenopathy:        Head (right side): No submental, no submandibular, no tonsillar, no preauricular, no posterior auricular and no occipital adenopathy present.        Head (left side): No submental, no submandibular, no tonsillar, no preauricular, no posterior auricular and no occipital adenopathy present.     He has no cervical adenopathy.     He has no axillary adenopathy.        Right: No inguinal, no supraclavicular and no epitrochlear adenopathy present.        Left: No inguinal, no supraclavicular and no epitrochlear adenopathy present.   Neurological: He is alert and oriented to person, place, and time. He has normal strength. He displays normal reflexes.   Skin: Skin is warm and dry. Capillary refill takes 2 to 3 seconds.   Psychiatric: He has a normal mood and affect. His behavior is normal. Judgment and thought content normal. Cognition and memory are normal.   Nursing note and vitals reviewed.          Results for orders placed or performed in visit on 09/26/19   Comprehensive Metabolic Panel   Result Value Ref Range    Glucose 84 65 - 99 mg/dL    BUN 15 8 - 23 mg/dL    Creatinine 0.70 (L) 0.76 - 1.27 mg/dL    eGFR Non African Am 110 >60 mL/min/1.73    eGFR African Am 133 >60 mL/min/1.73    BUN/Creatinine Ratio 21.4 7.0 - 25.0    Sodium 141 136 - 145 mmol/L    Potassium 4.5 3.5 - 5.2 mmol/L    Chloride 103 98 - 107 mmol/L    Total CO2 25.9 22.0 - 29.0 mmol/L    Calcium 9.9 8.6 - 10.5 mg/dL    Total Protein 7.3 6.0 - 8.5 g/dL    Albumin 4.80 3.50 - 5.20 g/dL    Globulin 2.5 gm/dL    A/G Ratio 1.9 g/dL    Total Bilirubin 0.6 0.2 - 1.2 mg/dL    Alkaline Phosphatase 78 39 - 117 U/L    AST (SGOT) 22 1 - 40 U/L    ALT (SGPT) 22 1 - 41 U/L   Lipid Panel   Result Value Ref Range    Total Cholesterol 188 0 - 200 mg/dL    Triglycerides 152 (H) 0 - 150 mg/dL    HDL Cholesterol 60 40 - 60 mg/dL    VLDL Cholesterol  30.4 mg/dL    LDL Cholesterol  98 0 - 100 mg/dL   POC Urinalysis Dipstick, Automated   Result Value Ref Range    Color Yellow Yellow, Straw, Dark Yellow, Daisy    Clarity, UA Clear Clear    Specific Gravity  1.010 1.005 - 1.030    pH, Urine 7.0 5.0 - 8.0    Leukocytes Negative Negative    Nitrite, UA Negative Negative    Protein, POC Negative Negative mg/dL    Glucose, UA Negative Negative, 1000 mg/dL (3+) mg/dL    Ketones, UA Negative Negative    Urobilinogen, UA Normal Normal    Bilirubin Negative Negative    Blood, UA Negative Negative    Lot Number [id number]     Expiration Date 12-31-19    CBC & Differential   Result Value Ref Range    WBC 7.13 3.40 - 10.80 10*3/mm3    RBC 4.45 4.14 - 5.80 10*6/mm3    Hemoglobin 14.4 13.0 - 17.7 g/dL    Hematocrit 43.8 37.5 - 51.0 %    MCV 98.4 (H) 79.0 - 97.0 fL    MCH 32.4 26.6 - 33.0 pg    MCHC 32.9 31.5 - 35.7 g/dL    RDW 12.7 12.3 - 15.4 %    Platelets 326 140 - 450 10*3/mm3    Neutrophil Rel % 65.2 42.7 - 76.0 %    Lymphocyte Rel % 23.7 19.6 - 45.3 %    Monocyte Rel % 9.3 5.0 - 12.0 %    Eosinophil Rel % 1.0 0.3 - 6.2 %    Basophil Rel % 0.4 0.0 - 1.5 %    Neutrophils Absolute 4.65 1.70 - 7.00 10*3/mm3    Lymphocytes Absolute 1.69 0.70 - 3.10 10*3/mm3    Monocytes Absolute 0.66 0.10 - 0.90 10*3/mm3    Eosinophils Absolute 0.07 0.00 - 0.40 10*3/mm3    Basophils Absolute 0.03 0.00 - 0.20 10*3/mm3    Immature Granulocyte Rel % 0.4 0.0 - 0.5 %    Immature Grans Absolute 0.03 0.00 - 0.05 10*3/mm3    nRBC 0.0 0.0 - 0.2 /100 WBC        Assessment/Plan   Kory was seen today for pre-op exam.    Diagnoses and all orders for this visit:    Mixed hyperlipidemia  -     Cancel: Comprehensive Metabolic Panel  -     Cancel: Lipid Panel    Essential hypertension  -     Cancel: Comprehensive Metabolic Panel  -     POC Urinalysis Dipstick, Automated    Chronic bronchitis, simple (CMS/HCC)    Benign non-nodular prostatic hyperplasia without lower urinary tract symptoms    Generalized anxiety  disorder    Pre-op exam  -     Cancel: CBC & Differential  -     Cancel: CBC Auto Differential    Other orders  -     Cancel: TSH  -     CBC & Differential  -     Comprehensive Metabolic Panel  -     Lipid Panel      Recheck 152/74    htn uncontrolled, although mildly elevated would like to have blood pressure more controlled range.-- pt declines treatment of blood pressure.  Reviewed the last on year's readings.  Pt v/u continues to declines.     Patient's preop clearance with a notation and a phone call to the doctor in regards to his blood pressure.    Return in about 6 months (around 3/26/2020) for fasting, Next scheduled follow up Dr Ortiz.  RTC/call  If symptoms worsen  Meds MOA and SE's reviewed and pt v/u

## 2019-09-27 LAB
ALBUMIN SERPL-MCNC: 4.8 G/DL (ref 3.5–5.2)
ALBUMIN/GLOB SERPL: 1.9 G/DL
ALP SERPL-CCNC: 78 U/L (ref 39–117)
ALT SERPL-CCNC: 22 U/L (ref 1–41)
AST SERPL-CCNC: 22 U/L (ref 1–40)
BASOPHILS # BLD AUTO: 0.03 10*3/MM3 (ref 0–0.2)
BASOPHILS NFR BLD AUTO: 0.4 % (ref 0–1.5)
BILIRUB SERPL-MCNC: 0.6 MG/DL (ref 0.2–1.2)
BUN SERPL-MCNC: 15 MG/DL (ref 8–23)
BUN/CREAT SERPL: 21.4 (ref 7–25)
CALCIUM SERPL-MCNC: 9.9 MG/DL (ref 8.6–10.5)
CHLORIDE SERPL-SCNC: 103 MMOL/L (ref 98–107)
CHOLEST SERPL-MCNC: 188 MG/DL (ref 0–200)
CO2 SERPL-SCNC: 25.9 MMOL/L (ref 22–29)
CREAT SERPL-MCNC: 0.7 MG/DL (ref 0.76–1.27)
EOSINOPHIL # BLD AUTO: 0.07 10*3/MM3 (ref 0–0.4)
EOSINOPHIL NFR BLD AUTO: 1 % (ref 0.3–6.2)
ERYTHROCYTE [DISTWIDTH] IN BLOOD BY AUTOMATED COUNT: 12.7 % (ref 12.3–15.4)
GLOBULIN SER CALC-MCNC: 2.5 GM/DL
GLUCOSE SERPL-MCNC: 84 MG/DL (ref 65–99)
HCT VFR BLD AUTO: 43.8 % (ref 37.5–51)
HDLC SERPL-MCNC: 60 MG/DL (ref 40–60)
HGB BLD-MCNC: 14.4 G/DL (ref 13–17.7)
IMM GRANULOCYTES # BLD AUTO: 0.03 10*3/MM3 (ref 0–0.05)
IMM GRANULOCYTES NFR BLD AUTO: 0.4 % (ref 0–0.5)
LDLC SERPL CALC-MCNC: 98 MG/DL (ref 0–100)
LYMPHOCYTES # BLD AUTO: 1.69 10*3/MM3 (ref 0.7–3.1)
LYMPHOCYTES NFR BLD AUTO: 23.7 % (ref 19.6–45.3)
MCH RBC QN AUTO: 32.4 PG (ref 26.6–33)
MCHC RBC AUTO-ENTMCNC: 32.9 G/DL (ref 31.5–35.7)
MCV RBC AUTO: 98.4 FL (ref 79–97)
MONOCYTES # BLD AUTO: 0.66 10*3/MM3 (ref 0.1–0.9)
MONOCYTES NFR BLD AUTO: 9.3 % (ref 5–12)
NEUTROPHILS # BLD AUTO: 4.65 10*3/MM3 (ref 1.7–7)
NEUTROPHILS NFR BLD AUTO: 65.2 % (ref 42.7–76)
NRBC BLD AUTO-RTO: 0 /100 WBC (ref 0–0.2)
PLATELET # BLD AUTO: 326 10*3/MM3 (ref 140–450)
POTASSIUM SERPL-SCNC: 4.5 MMOL/L (ref 3.5–5.2)
PROT SERPL-MCNC: 7.3 G/DL (ref 6–8.5)
RBC # BLD AUTO: 4.45 10*6/MM3 (ref 4.14–5.8)
SODIUM SERPL-SCNC: 141 MMOL/L (ref 136–145)
TRIGL SERPL-MCNC: 152 MG/DL (ref 0–150)
VLDLC SERPL CALC-MCNC: 30.4 MG/DL
WBC # BLD AUTO: 7.13 10*3/MM3 (ref 3.4–10.8)

## 2019-10-22 ENCOUNTER — OFFICE VISIT (OUTPATIENT)
Dept: INTERNAL MEDICINE | Facility: CLINIC | Age: 74
End: 2019-10-22

## 2019-10-22 VITALS
WEIGHT: 158 LBS | HEART RATE: 78 BPM | SYSTOLIC BLOOD PRESSURE: 136 MMHG | BODY MASS INDEX: 22.67 KG/M2 | RESPIRATION RATE: 19 BRPM | DIASTOLIC BLOOD PRESSURE: 62 MMHG

## 2019-10-22 DIAGNOSIS — N40.0 BENIGN NON-NODULAR PROSTATIC HYPERPLASIA WITHOUT LOWER URINARY TRACT SYMPTOMS: ICD-10-CM

## 2019-10-22 DIAGNOSIS — E78.2 MIXED HYPERLIPIDEMIA: ICD-10-CM

## 2019-10-22 DIAGNOSIS — I10 ESSENTIAL HYPERTENSION: ICD-10-CM

## 2019-10-22 DIAGNOSIS — Z23 NEED FOR INFLUENZA VACCINATION: Primary | ICD-10-CM

## 2019-10-22 PROCEDURE — 99214 OFFICE O/P EST MOD 30 MIN: CPT | Performed by: INTERNAL MEDICINE

## 2019-10-22 PROCEDURE — G0008 ADMIN INFLUENZA VIRUS VAC: HCPCS | Performed by: INTERNAL MEDICINE

## 2019-10-22 PROCEDURE — 90653 IIV ADJUVANT VACCINE IM: CPT | Performed by: INTERNAL MEDICINE

## 2019-10-22 RX ORDER — LOTEPREDNOL ETABONATE 3.8 MG/G
GEL OPHTHALMIC
Refills: 1 | COMMUNITY
Start: 2019-10-09 | End: 2022-05-04

## 2019-10-22 RX ORDER — NEPAFENAC 0.3 %
SUSPENSION, DROPS(FINAL DOSAGE FORM)(ML) OPHTHALMIC (EYE)
Refills: 1 | COMMUNITY
Start: 2019-09-25 | End: 2022-05-04

## 2019-10-22 NOTE — PROGRESS NOTES
Subjective   Kory Mcmanus is a 74 y.o. male.     History of Present Illness   For follow up of  HTN on no meds.  He does not want medication.  Was high at recent cataract surgery but is better today.  Hyperlipidemia on no meds.  BPH still is getting up a couple times at night but did not like taking medication.    The following portions of the patient's history were reviewed and updated as appropriate: allergies, current medications, past medical history, past social history and problem list.    Review of Systems   Constitutional: Negative.  Negative for fatigue and fever.   Eyes: Positive for visual disturbance.        Having some post op problems with his cataract surgery.   Respiratory: Negative.  Negative for cough, chest tightness, shortness of breath and wheezing.    Cardiovascular: Negative.  Negative for chest pain, palpitations and leg swelling.   Gastrointestinal: Negative.  Negative for abdominal distention and abdominal pain.   Genitourinary: Positive for frequency.       Objective   Physical Exam   Constitutional: He appears well-developed and well-nourished.   Neck: Normal range of motion. Neck supple.   Cardiovascular: Normal rate, regular rhythm and normal heart sounds. Exam reveals no friction rub.   No murmur heard.  142/82 by me right.   Pulmonary/Chest: Effort normal and breath sounds normal. No respiratory distress. He has no wheezes. He has no rales. He exhibits no tenderness.   Abdominal: Soft. Bowel sounds are normal. He exhibits no distension. There is no tenderness. There is no guarding.   Musculoskeletal: He exhibits no edema.   Neurological: He is alert.   Nursing note and vitals reviewed.        Assessment/Plan   Kory was seen today for essential hypertension.    Diagnoses and all orders for this visit:    Need for influenza vaccination  -     Fluad Tri 65yr+    Essential hypertension  BP is better today.  Stable,no change.    Mixed hyperlipidemia  Stable, no change.    Benign non-nodular  prostatic hyperplasia without lower urinary tract symptoms  Stable, no change.    All problems are stable.  Same meds.  Recheck 6 months.

## 2019-10-24 ENCOUNTER — OFFICE VISIT (OUTPATIENT)
Dept: INTERNAL MEDICINE | Facility: CLINIC | Age: 74
End: 2019-10-24

## 2019-10-24 VITALS
HEART RATE: 73 BPM | OXYGEN SATURATION: 95 % | TEMPERATURE: 98.1 F | RESPIRATION RATE: 20 BRPM | HEIGHT: 70 IN | DIASTOLIC BLOOD PRESSURE: 60 MMHG | BODY MASS INDEX: 22.76 KG/M2 | SYSTOLIC BLOOD PRESSURE: 120 MMHG | WEIGHT: 159 LBS

## 2019-10-24 DIAGNOSIS — Z87.891 HISTORY OF TOBACCO ABUSE: ICD-10-CM

## 2019-10-24 DIAGNOSIS — Z23 NEED FOR PNEUMOCOCCAL VACCINATION: ICD-10-CM

## 2019-10-24 DIAGNOSIS — Z00.00 INITIAL MEDICARE ANNUAL WELLNESS VISIT: Primary | ICD-10-CM

## 2019-10-24 PROCEDURE — G0009 ADMIN PNEUMOCOCCAL VACCINE: HCPCS | Performed by: PHYSICIAN ASSISTANT

## 2019-10-24 PROCEDURE — 90732 PPSV23 VACC 2 YRS+ SUBQ/IM: CPT | Performed by: PHYSICIAN ASSISTANT

## 2019-10-24 PROCEDURE — G0439 PPPS, SUBSEQ VISIT: HCPCS | Performed by: PHYSICIAN ASSISTANT

## 2019-10-24 NOTE — PROGRESS NOTES
The ABCs of the Annual Wellness Visit  Initial Medicare Wellness Visit    Chief Complaint   Patient presents with   • Medicare Wellness-Initial Visit       Subjective   History of Present Illness:  Kory Mcmanus is a 74 y.o. male who presents for an Initial Medicare Wellness Visit.    Had cataract excision of L eye on 10/2/2019 by Dr. Mondragon. He is scheduled on 10/30/2019 for cataract excision of R eye.       HEALTH RISK ASSESSMENT    Recent Hospitalizations:  No hospitalization(s) within the last year.    Current Medical Providers:  Patient Care Team:  Cal Soria MD as PCP - General    Smoking Status:  Social History     Tobacco Use   Smoking Status Former Smoker   Smokeless Tobacco Never Used       Alcohol Consumption:  Social History     Substance and Sexual Activity   Alcohol Use Yes    Comment: 1-2 drinks daily       Depression Screen:   PHQ-2/PHQ-9 Depression Screening 10/24/2019   Little interest or pleasure in doing things 0   Feeling down, depressed, or hopeless 0   Trouble falling or staying asleep, or sleeping too much -   Feeling tired or having little energy -   Poor appetite or overeating -   Feeling bad about yourself - or that you are a failure or have let yourself or your family down -   Trouble concentrating on things, such as reading the newspaper or watching television -   Moving or speaking so slowly that other people could have noticed. Or the opposite - being so fidgety or restless that you have been moving around a lot more than usual -   Thoughts that you would be better off dead, or of hurting yourself in some way -   Total Score 0   If you checked off any problems, how difficult have these problems made it for you to do your work, take care of things at home, or get along with other people? -       Fall Risk Screen:  STEADI Fall Risk Assessment was completed, and patient is at LOW risk for falls.Assessment completed on:10/24/2019    Health Habits and Functional and Cognitive  Screening:  Functional & Cognitive Status 10/24/2019   Do you have difficulty preparing food and eating? No   Do you have difficulty bathing yourself, getting dressed or grooming yourself? No   Do you have difficulty using the toilet? No   Do you have difficulty moving around from place to place? No   Do you have trouble with steps or getting out of a bed or a chair? No   Current Diet Well Balanced Diet   Dental Exam Up to date   Eye Exam Up to date   Exercise (times per week) 7 times per week   Current Exercise Activities Include Walking   Do you need help using the phone?  No   Are you deaf or do you have serious difficulty hearing?  No   Do you need help with transportation? No   Do you need help shopping? No   Do you need help preparing meals?  No   Do you need help with housework?  No   Do you need help with laundry? No   Do you need help taking your medications? No   Do you need help managing money? No   Do you ever drive or ride in a car without wearing a seat belt? No   Have you felt unusual stress, anger or loneliness in the last month? No   Who do you live with? Child   If you need help, do you have trouble finding someone available to you? No   Do you have difficulty concentrating, remembering or making decisions? No     Does the patient have evidence of cognitive impairment? No    Asprin use counseling:Does not need ASA (and currently is not on it)    Age-appropriate Screening Schedule:  Refer to the list below for future screening recommendations based on patient's age, sex and/or medical conditions. Orders for these recommended tests are listed in the plan section. The patient has been provided with a written plan.    Health Maintenance   Topic Date Due   • ZOSTER VACCINE (2 of 2) 02/26/2016   • LIPID PANEL  09/26/2020   • TDAP/TD VACCINES (2 - Td) 09/14/2027   • COLONOSCOPY  10/22/2028   • INFLUENZA VACCINE  Completed   • PNEUMOCOCCAL VACCINES (65+ LOW/MEDIUM RISK)  Completed          The following  portions of the patient's history were reviewed and updated as appropriate: allergies, current medications, past family history, past medical history, past social history, past surgical history and problem list.    Outpatient Medications Prior to Visit   Medication Sig Dispense Refill   • aspirin (BAILEE ADVANCED ASPIRIN REG ST) 325 MG tablet Take 1 tablet by mouth 3 (three) times a day.     • ILEVRO 0.3 % suspension INSTILL 1 DROP INTO LEFT EYE ONCE DAILY STARTING 3 DAYS BEFORE SURGERY  1   • LOTEMAX SM 0.38 % gel INSTILL 1 DROP INTO LEFT EYE THREE TIMES DAILY STARTING 3 DAYS BEFORE SURGERY  1     No facility-administered medications prior to visit.        Patient Active Problem List   Diagnosis   • Generalized anxiety disorder   • Mixed hyperlipidemia   • Essential hypertension   • URTI (acute upper respiratory infection)   • Dry skin   • Benign non-nodular prostatic hyperplasia without lower urinary tract symptoms   • Cough   • Chronic bronchitis, simple (CMS/[facility])       Advanced Care Planning:  Patient has an advance directive - a copy has not been provided. Have asked the patient to send this to us to add to record    Review of Systems   Constitutional: Negative for chills, fatigue and fever.   HENT: Negative for sore throat.    Eyes: Negative for pain.   Respiratory: Negative for cough and shortness of breath.    Cardiovascular: Negative for chest pain, palpitations and leg swelling.   Gastrointestinal: Negative for abdominal pain, blood in stool, nausea and vomiting.   Genitourinary: Negative for dysuria, frequency and hematuria.   Musculoskeletal: Negative for myalgias.   Neurological: Negative for dizziness and headaches.   Psychiatric/Behavioral: Negative for sleep disturbance.       Compared to one year ago, the patient feels his physical health is the same.  Compared to one year ago, the patient feels his mental health is the same.    Reviewed chart for potential of high risk medication in the elderly: not  "applicable  Reviewed chart for potential of harmful drug interactions in the elderly:not applicable    Objective         Vitals:    10/24/19 1525   BP: 120/60   Pulse: 73   Resp: 20   Temp: 98.1 °F (36.7 °C)   TempSrc: Temporal   SpO2: 95%   Weight: 72.1 kg (159 lb)   Height: 177.8 cm (70\")   PainSc: 0-No pain       Body mass index is 22.81 kg/m².  Discussed the patient's BMI with him. The BMI is in the acceptable range.    Physical Exam   Constitutional: He appears well-developed and well-nourished.   HENT:   Head: Normocephalic and atraumatic.   Right Ear: Tympanic membrane, external ear and ear canal normal.   Left Ear: Tympanic membrane, external ear and ear canal normal.   Mouth/Throat: Oropharynx is clear and moist and mucous membranes are normal.   Eyes: Conjunctivae are normal.   Neck: Carotid bruit is not present.   Cardiovascular: Normal rate, regular rhythm and intact distal pulses.   No murmur heard.  Pulmonary/Chest: Effort normal and breath sounds normal. He has no wheezes. He has no rales.   Abdominal: Normal appearance. There is no hepatosplenomegaly. There is no tenderness.   Lymphadenopathy:     He has no cervical adenopathy.   Psychiatric: He has a normal mood and affect. His behavior is normal.       Lab Results   Component Value Date    GLU 84 09/26/2019    CHLPL 188 09/26/2019    TRIG 152 (H) 09/26/2019    HDL 60 09/26/2019    LDL 98 09/26/2019    VLDL 30.4 09/26/2019        Assessment/Plan   Medicare Risks and Personalized Health Plan  CMS Preventative Services Quick Reference  Abdominal Aortic Aneurysm Screening  Advance Directive Discussion  Immunizations Discussed/Encouraged (specific immunizations; Influenza )    The above risks/problems have been discussed with the patient.  Pertinent information has been shared with the patient in the After Visit Summary.  Follow up plans and orders are seen below in the Assessment/Plan Section.    Diagnoses and all orders for this visit:    1. Initial " Medicare annual wellness visit (Primary)    2. Need for pneumococcal vaccination  -     Pneumococcal Polysaccharide Vaccine 23-Valent Greater Than or Equal To 1yo Subcutaneous / IM    3. History of tobacco abuse  -     [place] aaa screen limited; Future      AAA screen after thanksgiving     Follow Up:  Return in about 1 year (around 10/24/2020) for Subs AMW.     An After Visit Summary and PPPS were given to the patient.

## 2019-11-25 ENCOUNTER — HOSPITAL ENCOUNTER (OUTPATIENT)
Dept: ULTRASOUND IMAGING | Facility: HOSPITAL | Age: 74
Discharge: HOME | End: 2019-11-25
Admitting: PHYSICIAN ASSISTANT

## 2019-11-25 DIAGNOSIS — Z87.891 HISTORY OF TOBACCO ABUSE: ICD-10-CM

## 2019-11-25 PROCEDURE — 76706 US ABDL AORTA SCREEN AAA: CPT

## 2019-12-16 ENCOUNTER — OFFICE VISIT (OUTPATIENT)
Dept: INTERNAL MEDICINE | Facility: CLINIC | Age: 74
End: 2019-12-16

## 2019-12-16 ENCOUNTER — TELEPHONE (OUTPATIENT)
Dept: INTERNAL MEDICINE | Facility: CLINIC | Age: 74
End: 2019-12-16

## 2019-12-16 VITALS
HEART RATE: 70 BPM | BODY MASS INDEX: 23.1 KG/M2 | DIASTOLIC BLOOD PRESSURE: 68 MMHG | WEIGHT: 161 LBS | RESPIRATION RATE: 21 BRPM | SYSTOLIC BLOOD PRESSURE: 142 MMHG | TEMPERATURE: 98.3 F

## 2019-12-16 DIAGNOSIS — R30.0 DYSURIA: ICD-10-CM

## 2019-12-16 DIAGNOSIS — R31.9 HEMATURIA, UNSPECIFIED TYPE: ICD-10-CM

## 2019-12-16 DIAGNOSIS — N41.0 ACUTE PROSTATITIS: Primary | ICD-10-CM

## 2019-12-16 LAB
BILIRUB BLD-MCNC: NEGATIVE MG/DL
CLARITY, POC: CLEAR
COLOR UR: YELLOW
EXPIRATION DATE: ABNORMAL
GLUCOSE UR STRIP-MCNC: NEGATIVE MG/DL
KETONES UR QL: NEGATIVE
LEUKOCYTE EST, POC: NEGATIVE
Lab: ABNORMAL
NITRITE UR-MCNC: NEGATIVE MG/ML
PH UR: 7 [PH] (ref 5–8)
PROT UR STRIP-MCNC: NEGATIVE MG/DL
RBC # UR STRIP: ABNORMAL /UL
SP GR UR: 1.01 (ref 1–1.03)
UROBILINOGEN UR QL: NORMAL

## 2019-12-16 PROCEDURE — 99213 OFFICE O/P EST LOW 20 MIN: CPT | Performed by: INTERNAL MEDICINE

## 2019-12-16 PROCEDURE — 81003 URINALYSIS AUTO W/O SCOPE: CPT | Performed by: INTERNAL MEDICINE

## 2019-12-16 RX ORDER — SULFAMETHOXAZOLE AND TRIMETHOPRIM 800; 160 MG/1; MG/1
1 TABLET ORAL 2 TIMES DAILY
Qty: 14 TABLET | Refills: 0 | Status: SHIPPED | OUTPATIENT
Start: 2019-12-16 | End: 2022-05-04

## 2019-12-16 NOTE — PROGRESS NOTES
Subjective   Wade Mcmanus is a 74 y.o. male.     History of Present Illness   He has had some urinary leakage on and off for some time.  Over the past week he has noticed and increase and some dysuria and some decreased stream.  No fever or hematuria.    The following portions of the patient's history were reviewed and updated as appropriate: allergies, current medications, past medical history, past social history and problem list.    Review of Systems   Constitutional: Negative.  Negative for fatigue and fever.   Respiratory: Negative.    Genitourinary: Positive for decreased urine volume, dysuria, frequency and urinary incontinence.       Objective   Physical Exam   Constitutional: He appears well-developed and well-nourished.   No PE.   Nursing note and vitals reviewed.        Assessment/Plan   Wade was seen today for difficulty urinating.    Diagnoses and all orders for this visit:    Acute prostatitis  -     sulfamethoxazole-trimethoprim (BACTRIM DS,SEPTRA DS) 800-160 MG per tablet; Take 1 tablet by mouth 2 (Two) Times a Day.    Dysuria  -     POC Urinalysis Dipstick, Automated    Hematuria, unspecified type  -     Urinalysis, Microscopic Only - Urine, Clean Catch    Urine is fine but for a small amount of blood.  Will treat as a prostatitis.  He will call if not better.

## 2019-12-17 LAB
BACTERIA #/AREA URNS HPF: NORMAL /HPF
CASTS URNS MICRO: NORMAL
EPI CELLS #/AREA URNS HPF: NORMAL /HPF
RBC #/AREA URNS HPF: NORMAL /HPF
WBC #/AREA URNS HPF: NORMAL /HPF

## 2022-05-04 ENCOUNTER — OFFICE VISIT (OUTPATIENT)
Dept: INTERNAL MEDICINE | Facility: CLINIC | Age: 77
End: 2022-05-04

## 2022-05-04 VITALS
DIASTOLIC BLOOD PRESSURE: 92 MMHG | BODY MASS INDEX: 20.41 KG/M2 | HEIGHT: 69 IN | SYSTOLIC BLOOD PRESSURE: 160 MMHG | WEIGHT: 137.8 LBS | HEART RATE: 72 BPM | TEMPERATURE: 97.7 F

## 2022-05-04 DIAGNOSIS — R10.2 PELVIC PAIN: ICD-10-CM

## 2022-05-04 DIAGNOSIS — E78.2 MIXED HYPERLIPIDEMIA: Chronic | ICD-10-CM

## 2022-05-04 DIAGNOSIS — N40.0 BENIGN NON-NODULAR PROSTATIC HYPERPLASIA WITHOUT LOWER URINARY TRACT SYMPTOMS: Chronic | ICD-10-CM

## 2022-05-04 DIAGNOSIS — I10 ESSENTIAL HYPERTENSION: Chronic | ICD-10-CM

## 2022-05-04 DIAGNOSIS — R63.4 WEIGHT LOSS, UNINTENTIONAL: ICD-10-CM

## 2022-05-04 DIAGNOSIS — Z11.59 NEED FOR HEPATITIS C SCREENING TEST: ICD-10-CM

## 2022-05-04 DIAGNOSIS — R31.0 GROSS HEMATURIA: Primary | ICD-10-CM

## 2022-05-04 PROBLEM — J06.9 URTI (ACUTE UPPER RESPIRATORY INFECTION): Status: RESOLVED | Noted: 2017-02-09 | Resolved: 2022-05-04

## 2022-05-04 PROCEDURE — 99215 OFFICE O/P EST HI 40 MIN: CPT | Performed by: STUDENT IN AN ORGANIZED HEALTH CARE EDUCATION/TRAINING PROGRAM

## 2022-05-04 RX ORDER — ACETAMINOPHEN 325 MG/1
650 TABLET ORAL AS NEEDED
COMMUNITY

## 2022-05-04 NOTE — PROGRESS NOTES
"Chief Complaint  Wade Mcmanus is a 77 y.o. male presenting for Weight Loss (Establish Care   over last few months  /), Pain (Pelvic area   right knee and right foot), and bloodwork (Wants to check for cancers).     From [facility]. Lived in [place] most of his life. . Lives with his daughter. Also has son in [place]. Worked as  in the past, still working from home.    Patient has a past medical history of hypertension off medications, BPH, anxiety disorder, hyperlipidemia and actinic keratosis.    History of Present Illness  Patient is here to establish care.  He used to follow with Dr. Soria who retired, his last visit was in 2019.    He currently uses occasional aspirin and Tylenol, otherwise no medications.    Mild Carmelita time he had 2 days of gross hematuria, no clots, but it was clearly red/bloody.  Not think.  He does have a history of BPH and has difficulties urinating at baseline, nocturia up to 5 times.  This has not gotten a lot worse since this started.  However over the last 6 months he has been experiencing weight loss, his baseline has been around 160 pounds, is now down to 237 pounds.  He states his appetite is good.  He also has complained of some diffuse pelvic pain.  Of note he has normal PSA in the past.  He is concerned for prostate cancer.  He did smoke years ago.    The following portions of the patient's history were reviewed and updated as appropriate: allergies, current medications, past family history, past medical history, past social history, past surgical history and problem list.    Objective  /92 (BP Location: Left arm, Patient Position: Sitting, Cuff Size: Adult)   Pulse 72   Temp 97.7 °F (36.5 °C) (Temporal)   Ht 175.3 cm (69\")   Wt 62.5 kg (137 lb 12.8 oz)   BMI 20.35 kg/m²     Physical Exam  Vitals reviewed.   Constitutional:       Appearance: Normal appearance.   HENT:      Head: Normocephalic and atraumatic.      Nose: Nose normal. " No congestion.      Mouth/Throat:      Mouth: Mucous membranes are moist.   Eyes:      Extraocular Movements: Extraocular movements intact.      Conjunctiva/sclera: Conjunctivae normal.   Cardiovascular:      Rate and Rhythm: Normal rate and regular rhythm.      Heart sounds: Normal heart sounds. No murmur heard.  Pulmonary:      Effort: Pulmonary effort is normal.      Breath sounds: Normal breath sounds.   Abdominal:      General: There is no distension.      Palpations: Abdomen is soft. There is no mass.      Tenderness: There is no abdominal tenderness.   Genitourinary:     Rectum: Normal.      Comments: CORINNA: Small amounts of stool in the rectal vault, no palpable lesions.  Prostate is moderately enlarged, adenomatous without any convincing nodularities.  Musculoskeletal:      Cervical back: Neck supple.      Right lower leg: No edema.      Left lower leg: No edema.   Skin:     General: Skin is warm and dry.   Neurological:      Mental Status: He is alert and oriented to person, place, and time. Mental status is at baseline.   Psychiatric:         Behavior: Behavior normal.         Thought Content: Thought content normal.         Assessment/Plan   1. Gross hematuria  2. Weight loss, unintentional  We will refer to urology for work-up of gross hematuria.  We will do blood work as ordered.  We will also do x-ray for his pelvic pain.  I would be surprised if this is a prostate cancer with his normal PSA, however he cannot be completely ruled out.  I suspect he might need cystoscopy.  - PSA DIAGNOSTIC; Future  - Urinalysis With Culture If Indicated -; Future  - TSH Rfx On Abnormal To Free T4; Future  - Vitamin B12; Future  - CBC & Differential; Future  - Ambulatory Referral to Urology  - XR Pelvis 1 or 2 View; Future    3. Benign non-nodular prostatic hyperplasia without lower urinary tract symptoms  Overall stable, perhaps mildly worsened.    4. Pelvic pain  Will order x-ray.  - XR Pelvis 1 or 2 View; Future    5.  Essential hypertension  BP Readings from Last 3 Encounters:   05/04/22 160/92   12/16/19 142/68   10/24/19 120/60   I have strongly encouraged him to start on blood pressure medication.  His high blood pressure carries risk of stroke, blood vessel disease, kidney damage.  Patient still defers and wants to focus on his weight loss and work-up of his hematuria for now.    6. Mixed hyperlipidemia  Patient will return for fasting lipids tomorrow  - Lipid Panel; Future    7. Need for hepatitis C screening test  We will screen as recommended.  - Hepatitis C Antibody; Future    BMI is within normal parameters. No follow-up required.    Total time spent on chart review, charting and face-to-face with patient 45 minutes    Return in about 3 weeks (around 5/25/2022) for Medicare Wellness.    Future Appointments       Provider Department Center    6/1/2022 3:00 PM Cuate Slois MD [facility] INTERNAL MEDICINE GLO            Cuate Solis MD  Family Medicine  05/04/2022

## 2022-05-05 ENCOUNTER — TELEPHONE (OUTPATIENT)
Dept: INTERNAL MEDICINE | Facility: CLINIC | Age: 77
End: 2022-05-05

## 2022-05-05 ENCOUNTER — LAB (OUTPATIENT)
Dept: LAB | Facility: HOSPITAL | Age: 77
End: 2022-05-05

## 2022-05-05 ENCOUNTER — HOSPITAL ENCOUNTER (OUTPATIENT)
Dept: GENERAL RADIOLOGY | Facility: HOSPITAL | Age: 77
Discharge: HOME | End: 2022-05-05

## 2022-05-05 DIAGNOSIS — M79.671 FOOT PAIN, BILATERAL: ICD-10-CM

## 2022-05-05 DIAGNOSIS — R10.2 PELVIC PAIN: ICD-10-CM

## 2022-05-05 DIAGNOSIS — G89.29 CHRONIC PAIN OF RIGHT KNEE: ICD-10-CM

## 2022-05-05 DIAGNOSIS — M25.561 CHRONIC PAIN OF RIGHT KNEE: Primary | ICD-10-CM

## 2022-05-05 DIAGNOSIS — G89.29 CHRONIC PAIN OF RIGHT KNEE: Primary | ICD-10-CM

## 2022-05-05 DIAGNOSIS — M25.561 CHRONIC PAIN OF RIGHT KNEE: ICD-10-CM

## 2022-05-05 DIAGNOSIS — R31.0 GROSS HEMATURIA: ICD-10-CM

## 2022-05-05 DIAGNOSIS — Z11.59 NEED FOR HEPATITIS C SCREENING TEST: ICD-10-CM

## 2022-05-05 DIAGNOSIS — E78.2 MIXED HYPERLIPIDEMIA: Chronic | ICD-10-CM

## 2022-05-05 DIAGNOSIS — R63.4 WEIGHT LOSS, UNINTENTIONAL: ICD-10-CM

## 2022-05-05 DIAGNOSIS — M79.672 FOOT PAIN, BILATERAL: ICD-10-CM

## 2022-05-05 PROCEDURE — 81001 URINALYSIS AUTO W/SCOPE: CPT | Performed by: STUDENT IN AN ORGANIZED HEALTH CARE EDUCATION/TRAINING PROGRAM

## 2022-05-05 PROCEDURE — 73560 X-RAY EXAM OF KNEE 1 OR 2: CPT

## 2022-05-05 PROCEDURE — 82607 VITAMIN B-12: CPT

## 2022-05-05 PROCEDURE — 86803 HEPATITIS C AB TEST: CPT

## 2022-05-05 PROCEDURE — 73630 X-RAY EXAM OF FOOT: CPT

## 2022-05-05 PROCEDURE — 72170 X-RAY EXAM OF PELVIS: CPT

## 2022-05-05 PROCEDURE — 81001 URINALYSIS AUTO W/SCOPE: CPT

## 2022-05-05 PROCEDURE — 84153 ASSAY OF PSA TOTAL: CPT

## 2022-05-05 PROCEDURE — 84443 ASSAY THYROID STIM HORMONE: CPT

## 2022-05-05 PROCEDURE — 80061 LIPID PANEL: CPT

## 2022-05-05 PROCEDURE — 85025 COMPLETE CBC W/AUTO DIFF WBC: CPT

## 2022-05-05 NOTE — TELEPHONE ENCOUNTER
Patient called to see if he can have additional x-rays done when he goes to the diagnostic center to have x-ray of his pelvis.    Patient says he is having pain and swelling of the right knee and pain in both feet.  Can he have these additional x-rays at the same time?  He said he had discussed this during his last visit but no orders were put in.  Call him at [phone].  He plans on going to get his x-rays done today about 3pm.

## 2022-05-06 PROBLEM — M16.0 PRIMARY OSTEOARTHRITIS OF BOTH HIPS: Chronic | Status: ACTIVE | Noted: 2022-05-06

## 2022-05-06 PROBLEM — M15.9 PRIMARY OSTEOARTHRITIS INVOLVING MULTIPLE JOINTS: Chronic | Status: ACTIVE | Noted: 2022-05-06

## 2022-05-06 PROBLEM — M17.11 PRIMARY OSTEOARTHRITIS OF RIGHT KNEE: Chronic | Status: ACTIVE | Noted: 2022-05-06

## 2022-05-07 LAB
BASOPHILS # BLD AUTO: 0 X10E3/UL (ref 0–0.2)
BASOPHILS NFR BLD AUTO: 1 %
CHOLEST SERPL-MCNC: 204 MG/DL (ref 100–199)
EOSINOPHIL # BLD AUTO: 0.1 X10E3/UL (ref 0–0.4)
EOSINOPHIL NFR BLD AUTO: 1 %
ERYTHROCYTE [DISTWIDTH] IN BLOOD BY AUTOMATED COUNT: 12.7 % (ref 11.6–15.4)
HCT VFR BLD AUTO: 44.4 % (ref 37.5–51)
HCV AB S/CO SERPL IA: <0.1 S/CO RATIO (ref 0–0.9)
HDLC SERPL-MCNC: 66 MG/DL
HGB BLD-MCNC: 14.7 G/DL (ref 13–17.7)
IMM GRANULOCYTES # BLD AUTO: 0 X10E3/UL (ref 0–0.1)
IMM GRANULOCYTES NFR BLD AUTO: 0 %
LDLC SERPL CALC-MCNC: 123 MG/DL (ref 0–99)
LYMPHOCYTES # BLD AUTO: 1.7 X10E3/UL (ref 0.7–3.1)
LYMPHOCYTES NFR BLD AUTO: 21 %
MCH RBC QN AUTO: 32.5 PG (ref 26.6–33)
MCHC RBC AUTO-ENTMCNC: 33.1 G/DL (ref 31.5–35.7)
MCV RBC AUTO: 98 FL (ref 79–97)
MONOCYTES # BLD AUTO: 0.7 X10E3/UL (ref 0.1–0.9)
MONOCYTES NFR BLD AUTO: 8 %
NEUTROPHILS # BLD AUTO: 5.5 X10E3/UL (ref 1.4–7)
NEUTROPHILS NFR BLD AUTO: 69 %
PLATELET # BLD AUTO: 404 X10E3/UL (ref 150–450)
PSA SERPL-MCNC: 2.2 NG/ML (ref 0–4)
RBC # BLD AUTO: 4.52 X10E6/UL (ref 4.14–5.8)
TRIGL SERPL-MCNC: 86 MG/DL (ref 0–149)
TSH SERPL DL<=0.005 MIU/L-ACNC: 1.24 UIU/ML (ref 0.45–4.5)
VIT B12 SERPL-MCNC: 881 PG/ML (ref 232–1245)
VLDLC SERPL CALC-MCNC: 15 MG/DL (ref 5–40)
WBC # BLD AUTO: 8 X10E3/UL (ref 3.4–10.8)

## 2022-05-09 ENCOUNTER — LAB (OUTPATIENT)
Dept: LAB | Facility: HOSPITAL | Age: 77
End: 2022-05-09

## 2022-05-09 DIAGNOSIS — I10 ESSENTIAL HYPERTENSION: Primary | ICD-10-CM

## 2022-05-09 DIAGNOSIS — I10 ESSENTIAL HYPERTENSION: ICD-10-CM

## 2022-05-10 LAB
ALBUMIN SERPL-MCNC: 4.4 G/DL (ref 3.7–4.7)
ALBUMIN/GLOB SERPL: 1.5 {RATIO} (ref 1.2–2.2)
ALP SERPL-CCNC: 83 IU/L (ref 44–121)
ALT SERPL-CCNC: 20 IU/L (ref 0–44)
AST SERPL-CCNC: 22 IU/L (ref 0–40)
BILIRUB SERPL-MCNC: 0.4 MG/DL (ref 0–1.2)
BUN SERPL-MCNC: 18 MG/DL (ref 8–27)
BUN/CREAT SERPL: 25 (ref 10–24)
CALCIUM SERPL-MCNC: 9.8 MG/DL (ref 8.6–10.2)
CHLORIDE SERPL-SCNC: 101 MMOL/L (ref 96–106)
CO2 SERPL-SCNC: 25 MMOL/L (ref 20–29)
CREAT SERPL-MCNC: 0.71 MG/DL (ref 0.76–1.27)
EGFRCR SERPLBLD CKD-EPI 2021: 94 ML/MIN/1.73
GLOBULIN SER CALC-MCNC: 2.9 G/DL (ref 1.5–4.5)
GLUCOSE SERPL-MCNC: 80 MG/DL (ref 65–99)
POTASSIUM SERPL-SCNC: 4.4 MMOL/L (ref 3.5–5.2)
PROT SERPL-MCNC: 7.3 G/DL (ref 6–8.5)
SODIUM SERPL-SCNC: 140 MMOL/L (ref 134–144)

## 2022-06-01 ENCOUNTER — OFFICE VISIT (OUTPATIENT)
Dept: INTERNAL MEDICINE | Facility: CLINIC | Age: 77
End: 2022-06-01

## 2022-06-01 VITALS
WEIGHT: 138 LBS | BODY MASS INDEX: 20.44 KG/M2 | SYSTOLIC BLOOD PRESSURE: 138 MMHG | HEART RATE: 80 BPM | HEIGHT: 69 IN | TEMPERATURE: 98.6 F | DIASTOLIC BLOOD PRESSURE: 80 MMHG

## 2022-06-01 DIAGNOSIS — R63.4 WEIGHT LOSS, UNINTENTIONAL: ICD-10-CM

## 2022-06-01 DIAGNOSIS — R31.0 GROSS HEMATURIA: ICD-10-CM

## 2022-06-01 DIAGNOSIS — Z00.00 ENCOUNTER FOR SUBSEQUENT ANNUAL WELLNESS VISIT (AWV) IN MEDICARE PATIENT: Primary | ICD-10-CM

## 2022-06-01 DIAGNOSIS — I10 ESSENTIAL HYPERTENSION: Chronic | ICD-10-CM

## 2022-06-01 PROCEDURE — 96160 PT-FOCUSED HLTH RISK ASSMT: CPT | Performed by: STUDENT IN AN ORGANIZED HEALTH CARE EDUCATION/TRAINING PROGRAM

## 2022-06-01 PROCEDURE — 1170F FXNL STATUS ASSESSED: CPT | Performed by: STUDENT IN AN ORGANIZED HEALTH CARE EDUCATION/TRAINING PROGRAM

## 2022-06-01 PROCEDURE — 1159F MED LIST DOCD IN RCRD: CPT | Performed by: STUDENT IN AN ORGANIZED HEALTH CARE EDUCATION/TRAINING PROGRAM

## 2022-06-01 PROCEDURE — G0439 PPPS, SUBSEQ VISIT: HCPCS | Performed by: STUDENT IN AN ORGANIZED HEALTH CARE EDUCATION/TRAINING PROGRAM

## 2022-06-01 NOTE — PROGRESS NOTES
QUICK REFERENCE INFORMATION:  The ABCs of the Annual Wellness Visit    Subsequent Medicare Wellness Visit    From [facility]. Lived in [place] most of his life. . Lives with his daughter. Also has son in [place]. Worked as  in the past, still working from home.     Patient has a past medical history of hypertension off medications, BPH, anxiety disorder, hyperlipidemia and actinic keratosis.    Patient is here for annual Medicare visit and follow-up.  He has upcoming appointment in about 3 weeks with urology because of weight loss and episode of hematuria.  He states his appetite is good, and his weight loss has of, he is currently stable.    Patient is having a lot of pain of his right knee, he is taking aspirin 325 mg tablets for, he did try ibuprofen in the past, but it caused him to have abdominal discomfort.  He does not feel that Tylenol helps and would rather use aspirin for as needed.  He has no history of occlusive blood vessel disease.    Patient has cut down on alcohol, he used about 3 units a day, now has cut down to just 1 glass of wine with dinner.    HEALTH RISK ASSESSMENT    1945    Recent Hospitalizations:  No hospitalization(s) within the last year..        Current Medical Providers:  Patient Care Team:  Cuate Solis MD as PCP - General (Family Medicine)        Smoking Status:  Social History     Tobacco Use   Smoking Status Former Smoker   • Packs/day: 1.00   • Years: 13.00   • Pack years: 13.00   • Types: Cigarettes   • Quit date:    • Years since quittin.4   Smokeless Tobacco Never Used       Alcohol Consumption:  Social History     Substance and Sexual Activity   Alcohol Use Yes    Comment: 7 drinks weekly       Depression Screen:   PHQ-2/PHQ-9 Depression Screening 2022   Retired Total Score -   Little Interest or Pleasure in Doing Things 1-->several days   Feeling Down, Depressed or Hopeless 1-->several days   Trouble Falling or Staying  Asleep, or Sleeping Too Much 3-->nearly every day   Feeling Tired or Having Little Energy 3-->nearly every day   Poor Appetite or Overeating 0-->not at all   Feeling Bad about Yourself - or that You are a Failure or Have Let Yourself or Your Family Down 1-->several days   Trouble Concentrating on Things, Such as Reading the Newspaper or Watching Television 0-->not at all   Moving or Speaking So Slowly that Other People Could Have Noticed? Or the Opposite - Being So Fidgety 0-->not at all   Thoughts that You Would be Better Off Dead or of Hurting Yourself in Some Way 0-->not at all   PHQ-9: Brief Depression Severity Measure Score 9   If You Checked Off Any Problems, How Difficult Have These Problems Made It For You to Do Your Work, Take Care of Things at Home, or Get Along with Other People? not difficult at all       Health Habits and Functional and Cognitive Screening:  Functional & Cognitive Status 6/1/2022   Do you have difficulty preparing food and eating? No   Do you have difficulty bathing yourself, getting dressed or grooming yourself? No   Do you have difficulty using the toilet? No   Do you have difficulty moving around from place to place? No   Do you have trouble with steps or getting out of a bed or a chair? No   Current Diet Well Balanced Diet   Dental Exam Not up to date   Eye Exam Not up to date   Exercise (times per week) 0 times per week   Current Exercises Include No Regular Exercise   Current Exercise Activities Include -   Do you need help using the phone?  No   Are you deaf or do you have serious difficulty hearing?  No   Do you need help with transportation? No   Do you need help shopping? No   Do you need help preparing meals?  No   Do you need help with housework?  No   Do you need help with laundry? No   Do you need help taking your medications? No   Do you need help managing money? No   Do you ever drive or ride in a car without wearing a seat belt? No   Have you felt unusual stress, anger  or loneliness in the last month? -   Who do you live with? Child   If you need help, do you have trouble finding someone available to you? No   Have you been bothered in the last four weeks by sexual problems? No   Do you have difficulty concentrating, remembering or making decisions? No       Fall Risk Screen:  DEANA Fall Risk Assessment was completed, and patient is at MODERATE risk for falls. Assessment completed on:6/1/2022    ACE III MINI        Does the patient have evidence of cognitive impairment? No    Aspirin use counseling: Does not need ASA but is currently taking (advised patient that ASA is not indicated and patient chooses to stop it)    Recent Lab Results:  CMP:  Lab Results   Component Value Date    BUN 18 05/09/2022    CREATININE 0.71 (L) 05/09/2022    EGFRIFNONA 110 09/26/2019    EGFRIFAFRI 133 09/26/2019    BCR 25 (H) 05/09/2022     05/09/2022    K 4.4 05/09/2022    CO2 25 05/09/2022    CALCIUM 9.8 05/09/2022    PROTENTOTREF 7.3 05/09/2022    ALBUMIN 4.4 05/09/2022    LABGLOBREF 2.9 05/09/2022    LABIL2 1.5 05/09/2022    BILITOT 0.4 05/09/2022    ALKPHOS 83 05/09/2022    AST 22 05/09/2022    ALT 20 05/09/2022     HbA1c:  No results found for: HGBA1C  Microalbumin:  No results found for: MICROALBUR, POCMALB, POCCREAT  Lipid Panel  Lab Results   Component Value Date    TRIG 86 05/05/2022    HDL 66 05/05/2022     (H) 05/05/2022    AST 22 05/09/2022    ALT 20 05/09/2022       Visual Acuity:  No exam data present    Age-appropriate Screening Schedule:  Refer to the list below for future screening recommendations based on patient's age, sex and/or medical conditions. Orders for these recommended tests are listed in the plan section. The patient has been provided with a written plan.    Health Maintenance   Topic Date Due   • INFLUENZA VACCINE  08/01/2022   • LIPID PANEL  05/05/2023   • TDAP/TD VACCINES (2 - Td or Tdap) 09/14/2027   • ZOSTER VACCINE  Completed        Subjective   History  of Present Illness    Wade Mcmanus is a 77 y.o. male who presents for a Subsequent Wellness Visit.    CHRONIC CONDITIONS    The following portions of the patient's history were reviewed and updated as appropriate: allergies, current medications, past family history, past medical history, past social history, past surgical history and problem list.    Outpatient Medications Prior to Visit   Medication Sig Dispense Refill   • acetaminophen (TYLENOL) 325 MG tablet Take 650 mg by mouth As Needed for Mild Pain .     • aspirin 325 MG tablet Take 1 tablet by mouth 3 (Three) Times a Day. Taking 1  tablet in AM and 1 tablet at noon right now       No facility-administered medications prior to visit.       Patient Active Problem List   Diagnosis   • Generalized anxiety disorder   • Mixed hyperlipidemia   • Essential hypertension   • Dry skin   • Benign non-nodular prostatic hyperplasia without lower urinary tract symptoms   • Cough   • Chronic bronchitis, simple (HCC)   • Actinic keratosis   • Gross hematuria   • Weight loss, unintentional   • Primary osteoarthritis of both hips   • Primary osteoarthritis of right knee   • Primary osteoarthritis involving multiple joints       Advance Care Planning:  ACP discussion was held with the patient during this visit. Patient has an advance directive (not in EMR), copy requested.    Identification of Risk Factors:  Risk factors include: Advance Directive Discussion.    Review of Systems    Compared to one year ago, the patient feels his physical health is worse.  Compared to one year ago, the patient feels his mental health is worse.    Objective     Physical Exam  Vitals reviewed.   Constitutional:       Appearance: Normal appearance.   HENT:      Head: Normocephalic and atraumatic.      Nose: No congestion.   Eyes:      Extraocular Movements: Extraocular movements intact.      Conjunctiva/sclera: Conjunctivae normal.   Cardiovascular:      Rate and Rhythm: Normal rate and regular  "rhythm.      Heart sounds: Normal heart sounds. No murmur heard.  Pulmonary:      Effort: Pulmonary effort is normal.      Breath sounds: Normal breath sounds.   Abdominal:      General: There is no distension.      Palpations: Abdomen is soft. There is no mass.      Tenderness: There is no abdominal tenderness.   Musculoskeletal:      Cervical back: Neck supple.      Right lower leg: No edema.      Left lower leg: No edema.   Skin:     General: Skin is warm and dry.   Neurological:      Mental Status: He is alert and oriented to person, place, and time. Mental status is at baseline.   Psychiatric:         Behavior: Behavior normal.         Thought Content: Thought content normal.          Procedures     Vitals:    06/01/22 1507 06/01/22 1521 06/01/22 1626   BP: 150/84 146/80 138/80   BP Location: Right arm Right arm Left arm   Patient Position: Sitting Sitting Sitting   Cuff Size: Adult Adult Adult   Pulse: 80     Temp: 98.6 °F (37 °C)     TempSrc: Temporal     Weight: 62.6 kg (138 lb)     Height: 175 cm (68.9\")     PainSc:   6     PainLoc: Knee         BMI is within normal parameters. No other follow-up for BMI required.      Assessment & Plan     1. Encounter for subsequent annual wellness visit (AWV) in Medicare patient      2. Essential hypertension  BP Readings from Last 3 Encounters:   06/01/22 138/80   05/04/22 160/92   12/16/19 142/68   Blood pressure has improved, still borderline.  Counseled on DASH diet      3. Weight loss, unintentional  4. Gross hematuria  Weight has stabilized.  Would still recommend proceeding with evaluation by urology.  He is also reporting nocturia x6.  I have offered follow-up appointment for him, he declines at this time and wants to complete evaluation by neurology, he will get back in touch if needed.    BMI is within normal parameters. No other follow-up for BMI required.          Patient Self-Management and Personalized Health Advice  The patient has been provided with " "information about: exercise and preventive services including:   · Annual Wellness Visit (AWV).    Outpatient Encounter Medications as of 6/1/2022   Medication Sig Dispense Refill   • acetaminophen (TYLENOL) 325 MG tablet Take 650 mg by mouth As Needed for Mild Pain .     • aspirin 325 MG tablet Take 1 tablet by mouth 3 (Three) Times a Day. Taking 1  tablet in AM and 1 tablet at noon right now       No facility-administered encounter medications on file as of 6/1/2022.       Reviewed use of high risk medication in the elderly: yes  Reviewed for potential of harmful drug interactions in the elderly: yes    Follow Up:  Return in about 1 year (around 6/1/2023), or if symptoms worsen or fail to improve, for Medicare Wellness.     Future Appointments       Provider Department Center    6/20/2022 2:45 PM Jose Mercado MD [facility] UROLOGY GLO    6/5/2023 3:00 PM Cuate Solis MD [facility] INTERNAL MEDICINE GLO            Patient Instructions   https://www.nhlbi.nih.gov/files/docs/public/heart/dash_brief.pdf\">   DASH Eating Plan  DASH stands for Dietary Approaches to Stop Hypertension. The DASH eating plan is a healthy eating plan that has been shown to:  · Reduce high blood pressure (hypertension).  · Reduce your risk for type 2 diabetes, heart disease, and stroke.  · Help with weight loss.  What are tips for following this plan?  Reading food labels  · Check food labels for the amount of salt (sodium) per serving. Choose foods with less than 5 percent of the Daily Value of sodium. Generally, foods with less than 300 milligrams (mg) of sodium per serving fit into this eating plan.  · To find whole grains, look for the word \"whole\" as the first word in the ingredient list.  Shopping  · Buy products labeled as \"low-sodium\" or \"no salt added.\"  · Buy fresh foods. Avoid canned foods and pre-made or frozen meals.  Cooking  · Avoid adding salt when cooking. Use salt-free " seasonings or herbs instead of table salt or sea salt. Check with your health care provider or pharmacist before using salt substitutes.  · Do not oakes foods. Cook foods using healthy methods such as baking, boiling, grilling, roasting, and broiling instead.  · Cook with heart-healthy oils, such as olive, canola, avocado, soybean, or sunflower oil.  Meal planning    1. Eat a balanced diet that includes:  ? 4 or more servings of fruits and 4 or more servings of vegetables each day. Try to fill one-half of your plate with fruits and vegetables.  ? 6-8 servings of whole grains each day.  ? Less than 6 oz (170 g) of lean meat, poultry, or fish each day. A 3-oz (85-g) serving of meat is about the same size as a deck of cards. One egg equals 1 oz (28 g).  ? 2-3 servings of low-fat dairy each day. One serving is 1 cup (237 mL).  ? 1 serving of nuts, seeds, or beans 5 times each week.  ? 2-3 servings of heart-healthy fats. Healthy fats called omega-3 fatty acids are found in foods such as walnuts, flaxseeds, fortified milks, and eggs. These fats are also found in cold-water fish, such as sardines, salmon, and mackerel.  2. Limit how much you eat of:  ? Canned or prepackaged foods.  ? Food that is high in trans fat, such as some fried foods.  ? Food that is high in saturated fat, such as fatty meat.  ? Desserts and other sweets, sugary drinks, and other foods with added sugar.  ? Full-fat dairy products.  3. Do not salt foods before eating.  4. Do not eat more than 4 egg yolks a week.  5. Try to eat at least 2 vegetarian meals a week.  6. Eat more home-cooked food and less restaurant, buffet, and fast food.    Lifestyle  1. When eating at a restaurant, ask that your food be prepared with less salt or no salt, if possible.  2. If you drink alcohol:  1. Limit how much you use to:  § 0-1 drink a day for women who are not pregnant.  § 0-2 drinks a day for men.  2. Be aware of how much alcohol is in your drink. In the [place] one  drink equals one 12 oz bottle of beer (355 mL), one 5 oz glass of wine (148 mL), or one 1½ oz glass of hard liquor (44 mL).  General information  · Avoid eating more than 2,300 mg of salt a day. If you have hypertension, you may need to reduce your sodium intake to 1,500 mg a day.  · Work with your health care provider to maintain a healthy body weight or to lose weight. Ask what an ideal weight is for you.  · Get at least 30 minutes of exercise that causes your heart to beat faster (aerobic exercise) most days of the week. Activities may include walking, swimming, or biking.  · Work with your health care provider or dietitian to adjust your eating plan to your individual calorie needs.  What foods should I eat?  Fruits  All fresh, dried, or frozen fruit. Canned fruit in natural juice (without added sugar).  Vegetables  Fresh or frozen vegetables (raw, steamed, roasted, or grilled). Low-sodium or reduced-sodium tomato and vegetable juice. Low-sodium or reduced-sodium tomato sauce and tomato paste. Low-sodium or reduced-sodium canned vegetables.  Grains  Whole-grain or whole-wheat bread. Whole-grain or whole-wheat pasta. Brown rice. Oatmeal. Quinoa. Bulgur. Whole-grain and low-sodium cereals. Jackelyn bread. Low-fat, low-sodium crackers. Whole-wheat flour tortillas.  Meats and other proteins  Skinless chicken or turkey. Ground chicken or turkey. Pork with fat trimmed off. Fish and seafood. Egg whites. Dried beans, peas, or lentils. Unsalted nuts, nut butters, and seeds. Unsalted canned beans. Lean cuts of beef with fat trimmed off. Low-sodium, lean precooked or cured meat, such as sausages or meat loaves.  Dairy  Low-fat (1%) or fat-free (skim) milk. Reduced-fat, low-fat, or fat-free cheeses. Nonfat, low-sodium ricotta or cottage cheese. Low-fat or nonfat yogurt. Low-fat, low-sodium cheese.  Fats and oils  Soft margarine without trans fats. Vegetable oil. Reduced-fat, low-fat, or light mayonnaise and salad dressings  (reduced-sodium). Canola, safflower, olive, avocado, soybean, and sunflower oils. Avocado.  Seasonings and condiments  Herbs. Spices. Seasoning mixes without salt.  Other foods  Unsalted popcorn and pretzels. Fat-free sweets.  The items listed above may not be a complete list of foods and beverages you can eat. Contact a dietitian for more information.  What foods should I avoid?  Fruits  Canned fruit in a light or heavy syrup. Fried fruit. Fruit in cream or butter sauce.  Vegetables  Creamed or fried vegetables. Vegetables in a cheese sauce. Regular canned vegetables (not low-sodium or reduced-sodium). Regular canned tomato sauce and paste (not low-sodium or reduced-sodium). Regular tomato and vegetable juice (not low-sodium or reduced-sodium). Pickles. Olives.  Grains  Baked goods made with fat, such as croissants, muffins, or some breads. Dry pasta or rice meal packs.  Meats and other proteins  Fatty cuts of meat. Ribs. Fried meat. Alejandra. Bologna, salami, and other precooked or cured meats, such as sausages or meat loaves. Fat from the back of a pig (fatback). Bratwurst. Salted nuts and seeds. Canned beans with added salt. Canned or smoked fish. Whole eggs or egg yolks. Chicken or turkey with skin.  Dairy  Whole or 2% milk, cream, and half-and-half. Whole or full-fat cream cheese. Whole-fat or sweetened yogurt. Full-fat cheese. Nondairy creamers. Whipped toppings. Processed cheese and cheese spreads.  Fats and oils  Butter. Stick margarine. Lard. Shortening. Ghee. Alejandra fat. Tropical oils, such as coconut, palm kernel, or palm oil.  Seasonings and condiments  Onion salt, garlic salt, seasoned salt, table salt, and sea salt. Worcestershire sauce. Tartar sauce. Barbecue sauce. Teriyaki sauce. Soy sauce, including reduced-sodium. Steak sauce. Canned and packaged gravies. Fish sauce. Oyster sauce. Cocktail sauce. Store-bought horseradish. Ketchup. Mustard. Meat flavorings and tenderizers. Bouillon cubes. Hot sauces.  Pre-made or packaged marinades. Pre-made or packaged taco seasonings. Relishes. Regular salad dressings.  Other foods  Salted popcorn and pretzels.  The items listed above may not be a complete list of foods and beverages you should avoid. Contact a dietitian for more information.  Where to find more information  · National Heart, Lung, and Blood [place]: www.nhlbi.nih.gov  · American Heart Association: www.heart.org  · Academy of Nutrition and Dietetics: www.eatright.org  · National Kidney Foundation: www.kidney.org  Summary  · The DASH eating plan is a healthy eating plan that has been shown to reduce high blood pressure (hypertension). It may also reduce your risk for type 2 diabetes, heart disease, and stroke.  · When on the DASH eating plan, aim to eat more fresh fruits and vegetables, whole grains, lean proteins, low-fat dairy, and heart-healthy fats.  · With the DASH eating plan, you should limit salt (sodium) intake to 2,300 mg a day. If you have hypertension, you may need to reduce your sodium intake to 1,500 mg a day.  · Work with your health care provider or dietitian to adjust your eating plan to your individual calorie needs.  This information is not intended to replace advice given to you by your health care provider. Make sure you discuss any questions you have with your health care provider.  Document Revised: 11/20/2020 Document Reviewed: 11/20/2020  [employer] Patient Education © 2021 [employer]        An After Visit Summary and PPPS with all of these plans were given to the patient.      Cuate Solis MD

## 2022-06-01 NOTE — PATIENT INSTRUCTIONS
"https://www.nhlbi.nih.gov/files/docs/public/heart/dash_brief.pdf\">   DASH Eating Plan  DASH stands for Dietary Approaches to Stop Hypertension. The DASH eating plan is a healthy eating plan that has been shown to:  Reduce high blood pressure (hypertension).  Reduce your risk for type 2 diabetes, heart disease, and stroke.  Help with weight loss.  What are tips for following this plan?  Reading food labels  Check food labels for the amount of salt (sodium) per serving. Choose foods with less than 5 percent of the Daily Value of sodium. Generally, foods with less than 300 milligrams (mg) of sodium per serving fit into this eating plan.  To find whole grains, look for the word \"whole\" as the first word in the ingredient list.  Shopping  Buy products labeled as \"low-sodium\" or \"no salt added.\"  Buy fresh foods. Avoid canned foods and pre-made or frozen meals.  Cooking  Avoid adding salt when cooking. Use salt-free seasonings or herbs instead of table salt or sea salt. Check with your health care provider or pharmacist before using salt substitutes.  Do not oakes foods. Cook foods using healthy methods such as baking, boiling, grilling, roasting, and broiling instead.  Cook with heart-healthy oils, such as olive, canola, avocado, soybean, or sunflower oil.  Meal planning    Eat a balanced diet that includes:  4 or more servings of fruits and 4 or more servings of vegetables each day. Try to fill one-half of your plate with fruits and vegetables.  6-8 servings of whole grains each day.  Less than 6 oz (170 g) of lean meat, poultry, or fish each day. A 3-oz (85-g) serving of meat is about the same size as a deck of cards. One egg equals 1 oz (28 g).  2-3 servings of low-fat dairy each day. One serving is 1 cup (237 mL).  1 serving of nuts, seeds, or beans 5 times each week.  2-3 servings of heart-healthy fats. Healthy fats called omega-3 fatty acids are found in foods such as walnuts, flaxseeds, fortified milks, and eggs. " These fats are also found in cold-water fish, such as sardines, salmon, and mackerel.  Limit how much you eat of:  Canned or prepackaged foods.  Food that is high in trans fat, such as some fried foods.  Food that is high in saturated fat, such as fatty meat.  Desserts and other sweets, sugary drinks, and other foods with added sugar.  Full-fat dairy products.  Do not salt foods before eating.  Do not eat more than 4 egg yolks a week.  Try to eat at least 2 vegetarian meals a week.  Eat more home-cooked food and less restaurant, buffet, and fast food.    Lifestyle  When eating at a restaurant, ask that your food be prepared with less salt or no salt, if possible.  If you drink alcohol:  Limit how much you use to:  0-1 drink a day for women who are not pregnant.  0-2 drinks a day for men.  Be aware of how much alcohol is in your drink. In the [place] one drink equals one 12 oz bottle of beer (355 mL), one 5 oz glass of wine (148 mL), or one 1½ oz glass of hard liquor (44 mL).  General information  Avoid eating more than 2,300 mg of salt a day. If you have hypertension, you may need to reduce your sodium intake to 1,500 mg a day.  Work with your health care provider to maintain a healthy body weight or to lose weight. Ask what an ideal weight is for you.  Get at least 30 minutes of exercise that causes your heart to beat faster (aerobic exercise) most days of the week. Activities may include walking, swimming, or biking.  Work with your health care provider or dietitian to adjust your eating plan to your individual calorie needs.  What foods should I eat?  Fruits  All fresh, dried, or frozen fruit. Canned fruit in natural juice (without added sugar).  Vegetables  Fresh or frozen vegetables (raw, steamed, roasted, or grilled). Low-sodium or reduced-sodium tomato and vegetable juice. Low-sodium or reduced-sodium tomato sauce and tomato paste. Low-sodium or reduced-sodium canned vegetables.  Grains  Whole-grain or  whole-wheat bread. Whole-grain or whole-wheat pasta. Brown rice. Oatmeal. Quinoa. Bulgur. Whole-grain and low-sodium cereals. Jackelyn bread. Low-fat, low-sodium crackers. Whole-wheat flour tortillas.  Meats and other proteins  Skinless chicken or turkey. Ground chicken or turkey. Pork with fat trimmed off. Fish and seafood. Egg whites. Dried beans, peas, or lentils. Unsalted nuts, nut butters, and seeds. Unsalted canned beans. Lean cuts of beef with fat trimmed off. Low-sodium, lean precooked or cured meat, such as sausages or meat loaves.  Dairy  Low-fat (1%) or fat-free (skim) milk. Reduced-fat, low-fat, or fat-free cheeses. Nonfat, low-sodium ricotta or cottage cheese. Low-fat or nonfat yogurt. Low-fat, low-sodium cheese.  Fats and oils  Soft margarine without trans fats. Vegetable oil. Reduced-fat, low-fat, or light mayonnaise and salad dressings (reduced-sodium). Canola, safflower, olive, avocado, soybean, and sunflower oils. Avocado.  Seasonings and condiments  Herbs. Spices. Seasoning mixes without salt.  Other foods  Unsalted popcorn and pretzels. Fat-free sweets.  The items listed above may not be a complete list of foods and beverages you can eat. Contact a dietitian for more information.  What foods should I avoid?  Fruits  Canned fruit in a light or heavy syrup. Fried fruit. Fruit in cream or butter sauce.  Vegetables  Creamed or fried vegetables. Vegetables in a cheese sauce. Regular canned vegetables (not low-sodium or reduced-sodium). Regular canned tomato sauce and paste (not low-sodium or reduced-sodium). Regular tomato and vegetable juice (not low-sodium or reduced-sodium). Pickles. Olives.  Grains  Baked goods made with fat, such as croissants, muffins, or some breads. Dry pasta or rice meal packs.  Meats and other proteins  Fatty cuts of meat. Ribs. Fried meat. Alejandra. Bologna, salami, and other precooked or cured meats, such as sausages or meat loaves. Fat from the back of a pig (fatback).  Bratwurst. Salted nuts and seeds. Canned beans with added salt. Canned or smoked fish. Whole eggs or egg yolks. Chicken or turkey with skin.  Dairy  Whole or 2% milk, cream, and half-and-half. Whole or full-fat cream cheese. Whole-fat or sweetened yogurt. Full-fat cheese. Nondairy creamers. Whipped toppings. Processed cheese and cheese spreads.  Fats and oils  Butter. Stick margarine. Lard. Shortening. Ghee. Alejandra fat. Tropical oils, such as coconut, palm kernel, or palm oil.  Seasonings and condiments  Onion salt, garlic salt, seasoned salt, table salt, and sea salt. Worcestershire sauce. Tartar sauce. Barbecue sauce. Teriyaki sauce. Soy sauce, including reduced-sodium. Steak sauce. Canned and packaged gravies. Fish sauce. Oyster sauce. Cocktail sauce. Store-bought horseradish. Ketchup. Mustard. Meat flavorings and tenderizers. Bouillon cubes. Hot sauces. Pre-made or packaged marinades. Pre-made or packaged taco seasonings. Relishes. Regular salad dressings.  Other foods  Salted popcorn and pretzels.  The items listed above may not be a complete list of foods and beverages you should avoid. Contact a dietitian for more information.  Where to find more information  National Heart, Lung, and Blood [place]: www.nhlbi.nih.gov  American Heart Association: www.heart.org  Academy of Nutrition and Dietetics: www.eatright.org  National Kidney Foundation: www.kidney.org  Summary  The DASH eating plan is a healthy eating plan that has been shown to reduce high blood pressure (hypertension). It may also reduce your risk for type 2 diabetes, heart disease, and stroke.  When on the DASH eating plan, aim to eat more fresh fruits and vegetables, whole grains, lean proteins, low-fat dairy, and heart-healthy fats.  With the DASH eating plan, you should limit salt (sodium) intake to 2,300 mg a day. If you have hypertension, you may need to reduce your sodium intake to 1,500 mg a day.  Work with your health care provider or  dietitian to adjust your eating plan to your individual calorie needs.  This information is not intended to replace advice given to you by your health care provider. Make sure you discuss any questions you have with your health care provider.  Document Revised: 11/20/2020 Document Reviewed: 11/20/2020  Elsevier Patient Education © 2021 [employer] Inc.

## 2022-06-20 ENCOUNTER — OFFICE VISIT (OUTPATIENT)
Dept: UROLOGY | Facility: CLINIC | Age: 77
End: 2022-06-20

## 2022-06-20 VITALS
DIASTOLIC BLOOD PRESSURE: 74 MMHG | WEIGHT: 137.6 LBS | HEART RATE: 85 BPM | OXYGEN SATURATION: 96 % | HEIGHT: 69 IN | BODY MASS INDEX: 20.38 KG/M2 | SYSTOLIC BLOOD PRESSURE: 158 MMHG

## 2022-06-20 DIAGNOSIS — N13.8 BPH WITH OBSTRUCTION/LOWER URINARY TRACT SYMPTOMS: Primary | ICD-10-CM

## 2022-06-20 DIAGNOSIS — R31.0 GROSS HEMATURIA: ICD-10-CM

## 2022-06-20 DIAGNOSIS — N40.1 BPH WITH OBSTRUCTION/LOWER URINARY TRACT SYMPTOMS: Primary | ICD-10-CM

## 2022-06-20 LAB
BILIRUB BLD-MCNC: NEGATIVE MG/DL
CLARITY, POC: CLEAR
COLOR UR: YELLOW
EXPIRATION DATE: ABNORMAL
GLUCOSE UR STRIP-MCNC: NEGATIVE MG/DL
KETONES UR QL: NEGATIVE
LEUKOCYTE EST, POC: NEGATIVE
Lab: ABNORMAL
NITRITE UR-MCNC: NEGATIVE MG/ML
PH UR: 6.5 [PH] (ref 5–8)
PROT UR STRIP-MCNC: NEGATIVE MG/DL
RBC # UR STRIP: NEGATIVE /UL
SP GR UR: 1.01 (ref 1–1.03)
UROBILINOGEN UR QL: NORMAL

## 2022-06-20 PROCEDURE — 81003 URINALYSIS AUTO W/O SCOPE: CPT | Performed by: STUDENT IN AN ORGANIZED HEALTH CARE EDUCATION/TRAINING PROGRAM

## 2022-06-20 PROCEDURE — 99204 OFFICE O/P NEW MOD 45 MIN: CPT | Performed by: STUDENT IN AN ORGANIZED HEALTH CARE EDUCATION/TRAINING PROGRAM

## 2022-06-20 PROCEDURE — 51798 US URINE CAPACITY MEASURE: CPT | Performed by: STUDENT IN AN ORGANIZED HEALTH CARE EDUCATION/TRAINING PROGRAM

## 2022-06-20 RX ORDER — TAMSULOSIN HYDROCHLORIDE 0.4 MG/1
1 CAPSULE ORAL DAILY
Qty: 90 CAPSULE | Refills: 3 | Status: SHIPPED | OUTPATIENT
Start: 2022-06-20 | End: 2022-08-11

## 2022-06-20 NOTE — PROGRESS NOTES
"       Office Visit New Urology      Patient Name: Wade Mcmanus  : 1945   MRN: [id number]     Chief Complaint:    Chief Complaint   Patient presents with   • Gross Hematuria   • Weight Loss       Referring Provider: Cuate Solis MD    History of Present Illness: Wade Mcmanus is a 77 y.o. male who presents to Urology today for evaluation of gross hematuria.      Patient reports visible bleeding in the urine began in December, this lasted for a few days but then ceased. He did not seek evaluation for this. His hematuria returned 22. He denies clots at that time.     During these episodes, no abnormal symptoms, no dysuria or difficulty urinating. He has not been referred for CT Urogram via PCP to date.     Denies prior urinary tract infections. Denies dysuria.     He is a former smoker, quit , smoked 15 years, roughly a pack per day.     His older brother had prostate cancer treated with radiation, treated and still alive. Younger brother has an enlarged prostate. Never seen a Urologist prior.     Reports significant difficulty urinating with a weak stream, sensation of incomplete emptying, mild urgency and frequency, and nocturia 6-7 times nightly. Has never trialed Alpha blockers.  IPSS score severe at 28.  Evidence of incomplete bladder emptying with  mL.    No prior history of nephrolithiasis that he is aware of.     Lab Results   Component Value Date    PSA 2.2 2022    PSA 1.750 2019    PSA 1.750 2018    PSA 1.500 2017    PSA 1.70 2015    PSA 2.2 2015     Patient reports he has been told he has \"an enlarged prostate.\"    Subjective      Review of System: Review of Systems   Constitutional: Negative for chills, fatigue, fever and unexpected weight change.   HENT: Negative for sore throat.    Eyes: Negative for visual disturbance.   Respiratory: Negative for cough, chest tightness and shortness of breath.    Cardiovascular: Negative for chest pain and " leg swelling.   Gastrointestinal: Negative for blood in stool, constipation, diarrhea, nausea, rectal pain and vomiting.   Genitourinary: Positive for frequency and hematuria. Negative for decreased urine volume, difficulty urinating, dysuria, enuresis, flank pain, genital sores and urgency.   Musculoskeletal: Negative for back pain and joint swelling.   Skin: Negative for rash and wound.   Neurological: Negative for seizures, speech difficulty, weakness and headaches.   Psychiatric/Behavioral: Negative for confusion, sleep disturbance and suicidal ideas. The patient is not nervous/anxious.       I have reviewed the ROS documented by my clinical staff, I have updated appropriately and I agree. Jose Mercado MD    Past Medical History:   Past Medical History:   Diagnosis Date   • History of screening colonoscopy 10/22/2018    Normal colon to cecum. Grade 2 internal hemorrhoids. Repeat recommended at 5 year interval if remains healthy.    • Primary osteoarthritis involving multiple joints 2022   • Primary osteoarthritis of both hips 2022   • Primary osteoarthritis of right knee 2022   • Screening for AAA (abdominal aortic aneurysm) 2019    Patent nonaneurysmal abdominal aorta with mild ectasia of the proximal aortic segment.    • Sleep apnea        Past Surgical History:   Past Surgical History:   Procedure Laterality Date   • HERNIA REPAIR     • KNEE SURGERY     • TONSILLECTOMY         Family History:   Family History   Problem Relation Age of Onset   • Hypertension Mother    • Cancer Sister         breast   • Cancer Brother        Social History:   Social History     Socioeconomic History   • Marital status:    Tobacco Use   • Smoking status: Former Smoker     Packs/day: 1.00     Years: 13.00     Pack years: 13.00     Types: Cigarettes     Quit date:      Years since quittin.4   • Smokeless tobacco: Never Used   Vaping Use   • Vaping Use: Never used   Substance and Sexual  Activity   • Alcohol use: Yes     Comment: 7 drinks weekly   • Drug use: Never   • Sexual activity: Not Currently       Medications:     Current Outpatient Medications:   •  acetaminophen (TYLENOL) 325 MG tablet, Take 650 mg by mouth As Needed for Mild Pain ., Disp: , Rfl:   •  aspirin 325 MG tablet, Take 1 tablet by mouth 3 (Three) Times a Day. Taking 1  tablet in AM and 1 tablet at noon right now, Disp: , Rfl:   •  tamsulosin (FLOMAX) 0.4 MG capsule 24 hr capsule, Take 1 capsule by mouth Daily., Disp: 90 capsule, Rfl: 3    Allergies:   Allergies   Allergen Reactions   • Amoxicillin Rash   • Clindamycin Rash   • Penicillins Rash   • Sulfa Antibiotics Rash       IPSS Questionnaire (AUA-7):  Over the past month…    1)  Incomplete Emptying:       How often have you had a sensation of not emptying you had the sensation of not emptying your bladder completely after you finished urinating?  0 - Not at all   2)  Frequency:       How often have you had the urinate again less than two hours after you finished urinating?  5 - Almost always   3)  Intermittency:       How often have you found you stopped and started again several times when you urinated?   5 - Almost always   4) Urgency:      How often have you found it difficult to postpone urination?  5 - Almost always   5) Weak Stream:      How often have you had a weak urinary stream?  5 - Almost always   6) Straining:       How often have you had to push or strain to begin urination?  3 - About half the time   7) Nocturia:      How many times did you most typically get up to urinate from the time you went to bed at night until the time you got up in the morning?  5 - 5+ times   Total Score:  28   The International Prostate Symptom Score (IPSS) is used to screen, diagnose, track symptoms of benign prostatic hyperplasia (BPH).   0-7 (Mild Symptoms) 8-19 (Moderate) 20-35 (Severe)   Quality of Life (QoL):  If you were to spend the rest of your life with your urinary condition  "just the way it is now, how would you feel about that? 6-Terrible   Urine Leakage (Incontinence) 0-No Leakage       Sexual Health Inventory for Men (MADDY)   Over the past 6 months:     1. How do you rate your confidence that you could get and keep an erection?  0 - No sexual activity    2. When you had erections with sexual   stimulation, how often were your erections hard enough for penetration (entering your partner)?  0 - No Sexual Activity    3. During sexual intercourse, how often were you able to maintain your erection after you had penetrated (entered) your partner?  0 - Did not attempt intercourse   4. During sexual intercourse, how difficult was it to maintain your erection to completion of intercourse?  0 - Did not attempt intercourse   5. When you attempted sexual intercourse, how often was it satisfactory for you?  0 - Did not attempt intercourse    Total Score: 0   The Sexual Health Inventory for Men further classifies ED severity with the following breakpoints:   1-7 (Severe ED) 8-11 (Moderate ED) 12-16 (Mild to Moderate ED) 17-21 (Mild ED)      Post void residual bladder scan:   100 mL    Objective     Physical Exam:   Vital Signs:   Vitals:    06/20/22 1441   BP: 158/74   Pulse: 85   SpO2: 96%   Weight: 62.4 kg (137 lb 9.6 oz)   Height: 175 cm (68.9\")     Body mass index is 20.38 kg/m².     Physical Exam  Vitals and nursing note reviewed.   Constitutional:       Appearance: Normal appearance.   HENT:      Head: Normocephalic and atraumatic.      Nose: Nose normal.      Mouth/Throat:      Mouth: Mucous membranes are moist.      Pharynx: Oropharynx is clear.   Eyes:      Extraocular Movements: Extraocular movements intact.      Conjunctiva/sclera: Conjunctivae normal.      Pupils: Pupils are equal, round, and reactive to light.   Cardiovascular:      Rate and Rhythm: Normal rate and regular rhythm.   Pulmonary:      Effort: Pulmonary effort is normal. No respiratory distress.   Abdominal:      " Palpations: Abdomen is soft.      Tenderness: There is no abdominal tenderness. There is no right CVA tenderness or left CVA tenderness.   Genitourinary:     Comments: Circumcised phallus, orthotopic meatus, bilaterally descended testicles without masses, or lesions.     CORINNA: Normal rectal tone, no blood, estimated 80+ gram prostate without nodularity or firmness.   Musculoskeletal:         General: Normal range of motion.      Cervical back: Normal range of motion and neck supple.   Skin:     General: Skin is warm and dry.      Findings: No lesion or rash.   Neurological:      General: No focal deficit present.      Mental Status: He is alert and oriented to person, place, and time. Mental status is at baseline.   Psychiatric:         Mood and Affect: Mood normal.         Behavior: Behavior normal.         Labs:   Brief Urine Lab Results  (Last result in the past 365 days)      Color   Clarity   Blood   Leuk Est   Nitrite   Protein   CREAT   Urine HCG        06/20/22 1529 Yellow   Clear   Negative   Negative   Negative   Negative                      Lab Results   Component Value Date    GLUCOSE 80 05/09/2022    CALCIUM 9.8 05/09/2022     05/09/2022    K 4.4 05/09/2022    CO2 25 05/09/2022     05/09/2022    BUN 18 05/09/2022    CREATININE 0.71 (L) 05/09/2022    EGFRIFAFRI 133 09/26/2019    EGFRIFNONA 110 09/26/2019    BCR 25 (H) 05/09/2022    ANIONGAP 4 12/08/2015       Lab Results   Component Value Date    WBC 8.0 05/05/2022    HGB 14.7 05/05/2022    HCT 44.4 05/05/2022    MCV 98 (H) 05/05/2022     05/05/2022       Images:   XR Knee 1 or 2 View Right    Result Date: 5/5/2022  There is no evidence of fracture or dislocation. Osteoarthrosis changes are present, greatest involving the medial and patellofemoral compartments, with some narrowing, sclerosis and tiny osteophytes. There is no significant joint effusion. Fabella or heterotopic ossification noted posteriorly.  This report was finalized on  5/5/2022 12:57 PM by Kuldeep Soliz.      XR Pelvis 1 or 2 View    Result Date: 5/5/2022  No acute osseous findings. Mild degenerative change of the hip joints.  This report was finalized on 5/5/2022 2:53 PM by Charlie Mercado MD.      XR Foot 3+ View Bilateral    Result Date: 5/5/2022  No acute fracture or malalignment. Mild osteoarthritic change in the first metatarsophalangeal joints. Diffuse bony demineralization.  This report was finalized on 5/5/2022 1:43 PM by Charlie Mercado MD.        Measures:   Tobacco:   Wade Mcmanus  reports that he quit smoking about 45 years ago. His smoking use included cigarettes. He has a 13.00 pack-year smoking history. He has never used smokeless tobacco.           Assessment / Plan      Assessment/Plan:   Wade Mcmanus is a 77 y.o. male who presented today for evaluation of gross hematuria and BPH with lower urinary tract symptoms.  Patient has had progressive urinary symptoms, he has a severe symptom score, IPSS 28.  Regarding gross hematuria he has had at least 2 observable episodes of bright red blood in the toilet bowl.  He is a former smoker.  He does have a significantly enlarged prostate and incomplete bladder emptying.  Discussed sources of hematuria can include nephrolithiasis, enlarged prostate, malignancy, inflammation etc.  Work-up for gross hematuria would include CT urogram and flexible cystoscopy.  We discussed imaging as well as a flexible cystoscopy at length including risk, benefits and alternatives.      We will set him up for flexible cystoscopy on 7-5-2022.  We will also assess for prostatic obstruction given his severe urinary symptoms.  He has been reluctant to initiate medications in the past but he is amenable to initiating tamsulosin 0.4 mg daily today.  We will assess his symptom improvement at time of his flexible cystoscopy appointment.  We briefly discussed surgical options as well.  Patient has abnormal weight loss, 20 to 25 pounds within the last  year of unclear etiology.  PSA trend is normal.  Denies bone loss.  Digital rectal examination largely benign, no firm tumors palpable.  PSA is 2.2, normal for age.  No indication for prostate biopsy at this time.      Diagnoses and all orders for this visit:    1. BPH with obstruction/lower urinary tract symptoms (Primary)  -     tamsulosin (FLOMAX) 0.4 MG capsule 24 hr capsule; Take 1 capsule by mouth Daily.  Dispense: 90 capsule; Refill: 3    2. Gross hematuria  -Flexible cystoscopy 7-5-2022  -CT urogram next available  -We will use CT urogram to size patient's prostate is well    -     CT Abdomen Pelvis With & Without Contrast; Future          Follow Up:   Return in about 15 days (around 7/5/2022).    I spent approximately 30 minutes providing clinical care for this patient; including review of patient's chart and provider documentation, face to face time spent with patient in examination room (obtaining history, performing physical exam, discussing diagnosis and management options), placing orders, and completing patient documentation.     Jose Mercado MD  [facility] [place]

## 2022-07-07 ENCOUNTER — APPOINTMENT (OUTPATIENT)
Dept: CT IMAGING | Facility: HOSPITAL | Age: 77
End: 2022-07-07

## 2022-07-19 ENCOUNTER — HOSPITAL ENCOUNTER (OUTPATIENT)
Dept: CT IMAGING | Facility: HOSPITAL | Age: 77
Discharge: HOME | End: 2022-07-19
Admitting: STUDENT IN AN ORGANIZED HEALTH CARE EDUCATION/TRAINING PROGRAM

## 2022-07-19 DIAGNOSIS — R31.0 GROSS HEMATURIA: ICD-10-CM

## 2022-07-19 LAB — CREAT BLDA-MCNC: 0.7 MG/DL (ref 0.6–1.3)

## 2022-07-19 PROCEDURE — 0 IOPAMIDOL PER 1 ML: Performed by: STUDENT IN AN ORGANIZED HEALTH CARE EDUCATION/TRAINING PROGRAM

## 2022-07-19 PROCEDURE — 74178 CT ABD&PLV WO CNTR FLWD CNTR: CPT

## 2022-07-19 PROCEDURE — 82565 ASSAY OF CREATININE: CPT

## 2022-07-19 RX ADMIN — IOPAMIDOL 125 ML: 755 INJECTION, SOLUTION INTRAVENOUS at 16:28

## 2022-07-21 ENCOUNTER — TELEPHONE (OUTPATIENT)
Dept: UROLOGY | Facility: CLINIC | Age: 77
End: 2022-07-21

## 2022-07-21 NOTE — TELEPHONE ENCOUNTER
Attempted to call the patient back regarding results of CT urogram, he sees me on 7- for flexible cystoscopy in clinic.  His CT urogram demonstrates a massively enlarged prostate with chronic bladder outlet findings including bladder diverticuli and trabeculation with thickening of the bladder wall.  We will assess his prostatic obstruction and flexible cystoscopy appointment no other filling defects or other abnormalities along the urinary tract are noted.  He does have some small bilateral nonobstructing stones which we will likely monitor given the very small size.  Left the patient a voicemail as I was unable to get a hold of him and asked him to call me back if he has additional questions or concerns in the meantime.    Jose Mercado MD

## 2022-07-28 ENCOUNTER — PROCEDURE VISIT (OUTPATIENT)
Dept: UROLOGY | Facility: CLINIC | Age: 77
End: 2022-07-28

## 2022-07-28 VITALS — WEIGHT: 137 LBS | HEIGHT: 69 IN | BODY MASS INDEX: 20.29 KG/M2

## 2022-07-28 DIAGNOSIS — R31.0 GROSS HEMATURIA: Primary | ICD-10-CM

## 2022-07-28 DIAGNOSIS — N13.8 BPH WITH OBSTRUCTION/LOWER URINARY TRACT SYMPTOMS: ICD-10-CM

## 2022-07-28 DIAGNOSIS — N40.1 BPH WITH OBSTRUCTION/LOWER URINARY TRACT SYMPTOMS: ICD-10-CM

## 2022-07-28 PROCEDURE — 52000 CYSTOURETHROSCOPY: CPT | Performed by: STUDENT IN AN ORGANIZED HEALTH CARE EDUCATION/TRAINING PROGRAM

## 2022-07-28 PROCEDURE — 81003 URINALYSIS AUTO W/O SCOPE: CPT | Performed by: STUDENT IN AN ORGANIZED HEALTH CARE EDUCATION/TRAINING PROGRAM

## 2022-07-28 NOTE — PROGRESS NOTES
Preprocedure diagnosis  Gross hematuria  BPH with incomplete bladder emptying  LUTS    Postprocedure diagnosis  Gross hematuria  BPH with incomplete bladder emptying  LUTS    Procedure  Flexible Cystourethroscopy    Attending surgeon  Jose Mercado MD    Anesthesia  2% lidocaine jelly intraurethrally    Complications  None    Indications  77 y.o. male undergoing a flexible cystoscopy for the above mentioned indications.  Informed consent was obtained.          Prostate volume calculator - 145 to 175 cc 3D volume (elipsoid vs bullet volume)     Findings  The urethral urothelium was within normal limits with no visible strictures.      The prostatic urethra revealed severe lateral lobe coaptation with intravesical protrusion, with massive significant intravesical median lobe.     Bladder findings included bilateral ureters in orthotopic position, severe trabeculation along the posterior bladder wall with multiple shallow diverticuli, bladder appears chronically distended, findings consistent with chronic prostatic obstruction.    Procedure  The patient was placed in supine position and prepped and draped in sterile fashion with lidocaine jelly instill per the urethra for anesthesia 5 minutes prior to procedure start.  A brief timeout was performed including available nursing staff and the patient.      The 16 Fr digital flexible cystoscope was lubricated and gently advanced into the urethral meatus.     The penile and bulbar urethra appeared widely patent without visible lesion or stricture.     The prostatic urethra was notable for severe lateral lobe coaptation and obstruction with intravesical impingement, with significantly enlarged intravesical median lobe component.      The scope was then advanced into the bladder. The bladder was completely visualized starting with the trigone.     There were bilateral orthotopic ureteral orifices.     The posterior wall, lateral walls, anterior wall, and dome were  completely visualized WITHOUT obvious mucosal lesions, tumors, stones.    The patient had severe posterior bladder wall trabeculation with multiple shallow diverticuli indicative of chronic bladder distention and chronic bladder outlet obstruction.    The cystoscope was retroflexed and the bladder neck and prostate were further visualized and appeared normal.      The cystoscope was then gently withdrawn while visualizing the urethra and the procedure was then terminated.  The patient tolerated the procedure well.      Follow Up:   Gross hematuria work-up has been completed, negative CT urogram and negative cystoscopy for mucosal lesions tumors or stones.    Patient has a massively enlarged prostate roughly 175 cc in volume, discussed options including laser enucleation of the prostate versus robotic simple prostatectomy.  Patient states he has cataracts and therefore alpha blockers will need to be avoided.  He is unsure when he has upcoming surgery but he is not willing to initiate tamsulosin.  Discussed based on size of his prostate that I do not recommend medication treatment at this time and that he should consider surgical options given the chronic distention of his bladder, and the fact that he will continue to lose bladder function long-term based on bladder health appearance.  Surgical options certainly more definitive in a man with a size prostate which is massively enlarged.    Patient is amenable to moving forward with surgical option for chronic prostatic obstruction. Goal of surgery is preserve bladder function long term, unobstruct bladder, prevent infection, prevent bladder stones, reduce risk of recurrent hematuria (given his highly vascularized prostatic urethral mucosa).     Patient is leaning toward surgical option, I recommended a robotic simple prostatectomy in my hands, or referral to [facility] for laser enucleation if this is something he would like to consider.  He would like to discuss  with his daughter.  He will call me back with surgical decision.    1 month safety visit for now.      Jose Mercado MD  [facility]

## 2022-07-29 ENCOUNTER — PREP FOR SURGERY (OUTPATIENT)
Dept: OTHER | Facility: HOSPITAL | Age: 77
End: 2022-07-29

## 2022-07-29 ENCOUNTER — TELEPHONE (OUTPATIENT)
Dept: UROLOGY | Facility: CLINIC | Age: 77
End: 2022-07-29

## 2022-07-29 DIAGNOSIS — N40.1 BPH WITH OBSTRUCTION/LOWER URINARY TRACT SYMPTOMS: Primary | ICD-10-CM

## 2022-07-29 DIAGNOSIS — N13.8 BPH WITH OBSTRUCTION/LOWER URINARY TRACT SYMPTOMS: Primary | ICD-10-CM

## 2022-07-29 PROBLEM — C61 PROSTATE CANCER: Status: ACTIVE | Noted: 2022-07-29

## 2022-07-29 RX ORDER — SCOLOPAMINE TRANSDERMAL SYSTEM 1 MG/1
1 PATCH, EXTENDED RELEASE TRANSDERMAL CONTINUOUS
Status: CANCELLED | OUTPATIENT
Start: 2022-07-29 | End: 2022-08-01

## 2022-07-29 RX ORDER — MELOXICAM 15 MG/1
15 TABLET ORAL ONCE
Status: CANCELLED | OUTPATIENT
Start: 2022-07-29 | End: 2022-07-29

## 2022-07-29 RX ORDER — GABAPENTIN 300 MG/1
600 CAPSULE ORAL ONCE
Status: CANCELLED | OUTPATIENT
Start: 2022-07-29 | End: 2022-07-29

## 2022-07-29 RX ORDER — BUPIVACAINE HCL/0.9 % NACL/PF 0.1 %
2 PLASTIC BAG, INJECTION (ML) EPIDURAL ONCE
Status: CANCELLED | OUTPATIENT
Start: 2022-07-29 | End: 2022-07-29

## 2022-07-29 RX ORDER — ACETAMINOPHEN 500 MG
1000 TABLET ORAL ONCE
Status: CANCELLED | OUTPATIENT
Start: 2022-07-29 | End: 2022-07-29

## 2022-07-29 NOTE — TELEPHONE ENCOUNTER
Patient called, would like to proceed with robotic simple prostatectomy. All questions answered, risks previously discussed.  Planning 9/9/22, main OR.     Case request is placed.     Jose Mercado MD

## 2022-08-10 ENCOUNTER — TELEPHONE (OUTPATIENT)
Dept: UROLOGY | Facility: CLINIC | Age: 77
End: 2022-08-10

## 2022-08-11 ENCOUNTER — OFFICE VISIT (OUTPATIENT)
Dept: ORTHOPEDIC SURGERY | Facility: CLINIC | Age: 77
End: 2022-08-11

## 2022-08-11 VITALS
WEIGHT: 136.91 LBS | BODY MASS INDEX: 20.28 KG/M2 | DIASTOLIC BLOOD PRESSURE: 90 MMHG | HEIGHT: 69 IN | SYSTOLIC BLOOD PRESSURE: 148 MMHG

## 2022-08-11 DIAGNOSIS — M17.11 PRIMARY OSTEOARTHRITIS OF RIGHT KNEE: Primary | ICD-10-CM

## 2022-08-11 PROCEDURE — 99204 OFFICE O/P NEW MOD 45 MIN: CPT | Performed by: ORTHOPAEDIC SURGERY

## 2022-08-11 PROCEDURE — 20610 DRAIN/INJ JOINT/BURSA W/O US: CPT | Performed by: ORTHOPAEDIC SURGERY

## 2022-08-11 RX ORDER — TRIAMCINOLONE ACETONIDE 40 MG/ML
80 INJECTION, SUSPENSION INTRA-ARTICULAR; INTRAMUSCULAR
Status: COMPLETED | OUTPATIENT
Start: 2022-08-11 | End: 2022-08-11

## 2022-08-11 RX ORDER — LIDOCAINE HYDROCHLORIDE 10 MG/ML
3 INJECTION, SOLUTION EPIDURAL; INFILTRATION; INTRACAUDAL; PERINEURAL
Status: COMPLETED | OUTPATIENT
Start: 2022-08-11 | End: 2022-08-11

## 2022-08-11 RX ORDER — BUPIVACAINE HYDROCHLORIDE 2.5 MG/ML
3 INJECTION, SOLUTION EPIDURAL; INFILTRATION; INTRACAUDAL
Status: COMPLETED | OUTPATIENT
Start: 2022-08-11 | End: 2022-08-11

## 2022-08-11 RX ADMIN — BUPIVACAINE HYDROCHLORIDE 3 ML: 2.5 INJECTION, SOLUTION EPIDURAL; INFILTRATION; INTRACAUDAL at 09:29

## 2022-08-11 RX ADMIN — TRIAMCINOLONE ACETONIDE 80 MG: 40 INJECTION, SUSPENSION INTRA-ARTICULAR; INTRAMUSCULAR at 09:29

## 2022-08-11 RX ADMIN — LIDOCAINE HYDROCHLORIDE 3 ML: 10 INJECTION, SOLUTION EPIDURAL; INFILTRATION; INTRACAUDAL; PERINEURAL at 09:29

## 2022-08-11 NOTE — PROGRESS NOTES
Procedure   Large Joint Arthrocentesis: R knee  Date/Time: 8/11/2022 9:29 AM  Consent given by: patient  Site marked: site marked  Timeout: Immediately prior to procedure a time out was called to verify the correct patient, procedure, equipment, support staff and site/side marked as required   Supporting Documentation  Indications: pain   Procedure Details  Location: knee - R knee  Preparation: Patient was prepped and draped in the usual sterile fashion  Needle size: 22 G  Approach: anterolateral  Medications administered: 3 mL bupivacaine (PF) 0.25 %; 3 mL lidocaine PF 1% 1 %; 80 mg triamcinolone acetonide 40 MG/ML  Patient tolerance: patient tolerated the procedure well with no immediate complications

## 2022-08-11 NOTE — PROGRESS NOTES
Orthopaedic Clinic Note: Knee New Patient    Chief Complaint   Patient presents with   • Right Knee - Pain        HPI  Consult from: Cuate Solis MD    Wade Mcmanus is a 77 y.o. male who presents with right knee pain for 4 month(s). Onset twisting injury. Pain is localized to the medial joint line, anterior knee and is a 9/10 on the pain scale. Pain is described as aching. Associated symptoms include pain, swelling, popping and giving way/buckling. The pain is worse with sitting, rising from seated position and any movement of the joint; pain medication and/or NSAID make it better. Previous treatments have included: nothing since symptom onset. Although some transient relief was reported with these interventions, these conservative measures have failed and symptoms have persisted. The patient is limited in daily activities and has had a significant decrease in quality of life as a result. He denies fevers, chills, or constitutional symptoms.    I have reviewed the following portions of the patient's history:History of Present Illness    Past Medical History:   Diagnosis Date   • History of screening colonoscopy 10/22/2018    Normal colon to cecum. Grade 2 internal hemorrhoids. Repeat recommended at 5 year interval if remains healthy.    • Primary osteoarthritis involving multiple joints 5/6/2022   • Primary osteoarthritis of both hips 5/6/2022   • Primary osteoarthritis of right knee 5/6/2022   • Screening for AAA (abdominal aortic aneurysm) 11/25/2019    Patent nonaneurysmal abdominal aorta with mild ectasia of the proximal aortic segment.    • Sleep apnea       Past Surgical History:   Procedure Laterality Date   • HERNIA REPAIR     • KNEE SURGERY Right 2007    knee arthroscopy [facility]   • TONSILLECTOMY        Family History   Problem Relation Age of Onset   • Hypertension Mother    • Cancer Sister         breast   • Cancer Brother      Social History     Socioeconomic History   • Marital status:  "   Tobacco Use   • Smoking status: Former Smoker     Packs/day: 1.00     Years: 15.00     Pack years: 15.00     Types: Cigarettes     Start date: 1958     Quit date: 1973     Years since quittin.5   • Smokeless tobacco: Never Used   Vaping Use   • Vaping Use: Never used   Substance and Sexual Activity   • Alcohol use: Yes     Comment: 7 drinks weekly   • Drug use: Never   • Sexual activity: Not Currently      Current Outpatient Medications on File Prior to Visit   Medication Sig Dispense Refill   • acetaminophen (TYLENOL) 325 MG tablet Take 650 mg by mouth As Needed for Mild Pain .     • aspirin 325 MG tablet Take 1 tablet by mouth 3 (Three) Times a Day. Taking 1  tablet in AM and 1 tablet at noon right now     • [DISCONTINUED] tamsulosin (FLOMAX) 0.4 MG capsule 24 hr capsule Take 1 capsule by mouth Daily. 90 capsule 3     No current facility-administered medications on file prior to visit.      Allergies   Allergen Reactions   • Amoxicillin Rash   • Clindamycin Rash   • Penicillins Rash   • Sulfa Antibiotics Rash        Review of Systems   Constitutional: Negative.    HENT: Negative.    Eyes: Negative.    Respiratory: Negative.    Cardiovascular: Negative.    Gastrointestinal: Negative.    Endocrine: Negative.    Genitourinary: Negative.    Musculoskeletal: Positive for arthralgias.   Skin: Negative.    Allergic/Immunologic: Negative.    Neurological: Negative.    Hematological: Negative.    Psychiatric/Behavioral: Negative.         The patient's Review of Systems was personally reviewed and confirmed as accurate.    The following portions of the patient's history were reviewed and updated as appropriate: allergies, current medications, past family history, past medical history, past social history, past surgical history and problem list.    Physical Exam  Blood pressure 148/90, height 175 cm (68.9\"), weight 62.1 kg (136 lb 14.5 oz).    Body mass index is 20.28 kg/m².    GENERAL APPEARANCE: " awake, alert & oriented x 3, in no acute distress and well developed, well nourished  PSYCH: normal affect  LUNGS:  breathing nonlabored  EYES: sclera anicteric  CARDIOVASCULAR: palpable dorsalis pedis, palpable posterior tibial bilaterally. Capillary refill less than 2 seconds  EXTREMITIES: no clubbing, cyanosis  GAIT:  Antalgic            Right Lower Extremity Exam:   ----------  Hip Exam  ----------  FLEXION CONTRACTURE: None  FLEXION: 110 degrees  INTERNAL ROTATION: 20 degrees at 90 degrees of flexion   EXTERNAL ROTATION: 40 degrees at 90 degrees of flexion    PAIN WITH HIP MOTION: no  ----------  Knee Exam  ----------  ALIGNMENT: moderate varus, correctible to neutral    RANGE OF MOTION:  Decreased (5 - 120 degrees) with no extensor lag  LIGAMENTOUS STABILITY:   stable to varus and valgus stress at terminal extension and 30 degrees; retensioning of the MCL is appreciated with valgus stress at 30 degrees consistent with medial compartment degeneration     STRENGTH:  4/5 knee flexion, extension. 5/5 ankle dorsiflexion and plantarflexion.     PAIN WITH PALPATION: medial joint line and anterior knee  KNEE EFFUSION: yes, mild effusion  PAIN WITH KNEE ROM: yes, medially and anteriorly  PATELLAR CREPITUS: yes, painful and symptomatic  SPECIAL EXAM FINDINGS:  none    REFLEXES:  PATELLAR 2+/4  ACHILLES 2+/4    CLONUS: no  STRAIGHT LEG TEST:   negative    SENSATION TO LIGHT TOUCH:  DEEP PERONEAL/SUPERFICIAL PERONEAL/SURAL/SAPHENOUS/TIBIAL:   intact    EDEMA:  no  ERYTHEMA:  no  WOUNDS/INCISIONS:  no        Left Lower Extremity Exam:   ----------  Hip Exam  ----------  FLEXION CONTRACTURE: None  FLEXION: 110 degrees  INTERNAL ROTATION: 20 degrees at 90 degrees of flexion   EXTERNAL ROTATION: 40 degrees at 90 degrees of flexion    PAIN WITH HIP MOTION: no  ----------  Knee Exam  ----------  ALIGNMENT: neutral, no varus or valgus deformity     RANGE OF MOTION:  Normal (0-120 degrees) with no extensor lag or flexion  contracture  LIGAMENTOUS STABILITY:   stable to varus and valgus stress at terminal extension and 30 degrees without any evidence of laxity     STRENGTH:  5/5 knee flexion, extension. 5/5 ankle dorsiflexion and plantarflexion.     PAIN WITH PALPATION: denies tenderness to palpation about the knee, denies medial or lateral joint line pain  KNEE EFFUSION: no  PAIN WITH KNEE ROM: no  PATELLAR CREPITUS: yes, asymptomatic  SPECIAL EXAM FINDINGS:  Negative patellar compression    REFLEXES:  PATELLAR 2+/4  ACHILLES 2+/4    CLONUS: negative  STRAIGHT LEG TEST:   negative    SENSATION TO LIGHT TOUCH:  DEEP PERONEAL/SUPERFICIAL PERONEAL/SURAL/SAPHENOUS/TIBIAL:   intact    EDEMA:  no  ERYTHEMA:  no  WOUNDS/INCISIONS: no overlying skin problems.    ______________________________________________________________________  ______________________________________________________________________    RADIOGRAPHIC FINDINGS:   Right knee radiographs from 5/4/2022 are personally interpreted.  Radiographs demonstrate moderate to severe tricompartmental osteoarthritis with genu varum alignment.  Small periarticular osteophytes visualized in all compartments.  No acute bony injury or fracture.    Assessment/Plan:   Diagnosis Plan   1. Primary osteoarthritis of right knee       Patient suffering from osteoarthritis of the right knee.  I discussed treatment options with him regarding his ongoing knee pain.  He is agreeable to intra-articular cortisone injection the right knee today.  He will follow-up in 3 months for repeat assessment.    Procedure Note:  I discussed with the patient the potential benefits of performing a therapeutic injection of the right knee as well as potential risks including but not limited to infection, swelling, pain, bleeding, bruising, nerve/vessel damage, skin color changes, transient elevation in blood glucose levels, and fat atrophy. After informed consent and verifying correct patient, procedure site, and type of  procedure, the area was prepped with alcohol, ethyl chloride was used to numb the skin. Via the superolateral approach, 3cc of 1% lidocaine, 3 cc of 0.25% Marcaine and 2 cc of 40mg/ml of Kenalog were injected into the right knee. The patient tolerated the procedure well. There were no complications. A sterile dressing was placed over the injection site.      Jimmie Busby MD  08/11/22  09:30 EDT

## 2022-09-07 ENCOUNTER — PRE-ADMISSION TESTING (OUTPATIENT)
Dept: PREADMISSION TESTING | Facility: HOSPITAL | Age: 77
End: 2022-09-07

## 2022-09-07 VITALS — HEIGHT: 70 IN | WEIGHT: 135.47 LBS | BODY MASS INDEX: 19.39 KG/M2

## 2022-09-07 DIAGNOSIS — N40.1 BPH WITH OBSTRUCTION/LOWER URINARY TRACT SYMPTOMS: ICD-10-CM

## 2022-09-07 DIAGNOSIS — N13.8 BPH WITH OBSTRUCTION/LOWER URINARY TRACT SYMPTOMS: ICD-10-CM

## 2022-09-07 LAB
ABO GROUP BLD: NORMAL
ANION GAP SERPL CALCULATED.3IONS-SCNC: 11 MMOL/L (ref 5–15)
BLD GP AB SCN SERPL QL: NEGATIVE
BUN SERPL-MCNC: 15 MG/DL (ref 8–23)
BUN/CREAT SERPL: 20.5 (ref 7–25)
CALCIUM SPEC-SCNC: 9.6 MG/DL (ref 8.6–10.5)
CHLORIDE SERPL-SCNC: 101 MMOL/L (ref 98–107)
CO2 SERPL-SCNC: 26 MMOL/L (ref 22–29)
CREAT SERPL-MCNC: 0.73 MG/DL (ref 0.76–1.27)
DEPRECATED RDW RBC AUTO: 46.9 FL (ref 37–54)
EGFRCR SERPLBLD CKD-EPI 2021: 93.1 ML/MIN/1.73
ERYTHROCYTE [DISTWIDTH] IN BLOOD BY AUTOMATED COUNT: 13.2 % (ref 12.3–15.4)
GLUCOSE SERPL-MCNC: 95 MG/DL (ref 65–99)
HBA1C MFR BLD: 5.4 % (ref 4.8–5.6)
HCT VFR BLD AUTO: 38.7 % (ref 37.5–51)
HGB BLD-MCNC: 13.5 G/DL (ref 13–17.7)
MCH RBC QN AUTO: 33.4 PG (ref 26.6–33)
MCHC RBC AUTO-ENTMCNC: 34.9 G/DL (ref 31.5–35.7)
MCV RBC AUTO: 95.8 FL (ref 79–97)
PLATELET # BLD AUTO: 349 10*3/MM3 (ref 140–450)
PMV BLD AUTO: 9 FL (ref 6–12)
POTASSIUM SERPL-SCNC: 4.1 MMOL/L (ref 3.5–5.2)
QT INTERVAL: 372 MS
QTC INTERVAL: 412 MS
RBC # BLD AUTO: 4.04 10*6/MM3 (ref 4.14–5.8)
RH BLD: POSITIVE
SODIUM SERPL-SCNC: 138 MMOL/L (ref 136–145)
T&S EXPIRATION DATE: NORMAL
WBC NRBC COR # BLD: 7.91 10*3/MM3 (ref 3.4–10.8)

## 2022-09-07 PROCEDURE — 86850 RBC ANTIBODY SCREEN: CPT

## 2022-09-07 PROCEDURE — 93005 ELECTROCARDIOGRAM TRACING: CPT

## 2022-09-07 PROCEDURE — 80048 BASIC METABOLIC PNL TOTAL CA: CPT

## 2022-09-07 PROCEDURE — 83036 HEMOGLOBIN GLYCOSYLATED A1C: CPT

## 2022-09-07 PROCEDURE — 86900 BLOOD TYPING SEROLOGIC ABO: CPT

## 2022-09-07 PROCEDURE — 93010 ELECTROCARDIOGRAM REPORT: CPT | Performed by: INTERNAL MEDICINE

## 2022-09-07 PROCEDURE — 86901 BLOOD TYPING SEROLOGIC RH(D): CPT

## 2022-09-07 PROCEDURE — 36415 COLL VENOUS BLD VENIPUNCTURE: CPT

## 2022-09-07 PROCEDURE — 85027 COMPLETE CBC AUTOMATED: CPT

## 2022-09-07 NOTE — PAT
An arrival time for procedure was not provided during PAT visit. If patient had any questions or concerns about their arrival time, they were instructed to contact their surgeon/physician.  Additionally, if the patient referred to an arrival time that was acquired from their my chart account, patient was encouraged to verify that time with their surgeon/physician. Arrival times are NOT provided in Pre Admission Testing Department.    Patient to apply Chlorhexadine wipes  to surgical area (as instructed) the night before procedure and the AM of procedure. Wipes provided.    Patient instructed to drink 20 ounces of Gatorade and it needs to be completed 1 hour (for Main OR patients) or 2 hours (scheduled  section & [facility] patients) before given arrival time for procedure (NO RED Gatorade)    Patient verbalized understanding.    Patient denies any current skin issues.     Patient viewed general PAT education video as instructed in their preoperative information received from their surgeon.  Patient stated the general PAT education video was viewed in its entirety and survey completed.  Copies of [facility] general education handouts (Incentive Spirometry, Meds to Beds Program, Patient Belongings, Pre-op skin preparation instructions, Blood Glucose testing, Visitor policy, Surgery FAQ, Code H) distributed to patient if not printed. Education related to the PAT pass and skin preparation for surgery (if applicable) completed in PAT as a reinforcement to PAT education video. Patient instructed to return PAT pass provided today as well as completed skin preparation sheet (if applicable) on the day of procedure.     Additionally if patient had not viewed video yet but intended to view it at home or in our waiting area, then referred them to the handout with QR code/link provided during PAT visit.  Instructed patient to complete survey after viewing the video in its entirety.  Encouraged patient/family to read [facility] general  education handouts thoroughly and notify PAT staff with any questions or concerns. Patient verbalized understanding of all information and priority content.

## 2022-09-08 ENCOUNTER — ANESTHESIA EVENT (OUTPATIENT)
Dept: PERIOP | Facility: HOSPITAL | Age: 77
End: 2022-09-08

## 2022-09-08 RX ORDER — FAMOTIDINE 10 MG/ML
20 INJECTION, SOLUTION INTRAVENOUS ONCE
Status: CANCELLED | OUTPATIENT
Start: 2022-09-08 | End: 2022-09-08

## 2022-09-09 ENCOUNTER — ANESTHESIA EVENT CONVERTED (OUTPATIENT)
Dept: ANESTHESIOLOGY | Facility: HOSPITAL | Age: 77
End: 2022-09-09

## 2022-09-09 ENCOUNTER — HOSPITAL ENCOUNTER (INPATIENT)
Facility: HOSPITAL | Age: 77
LOS: 1 days | Discharge: HOME | End: 2022-09-10
Attending: STUDENT IN AN ORGANIZED HEALTH CARE EDUCATION/TRAINING PROGRAM | Admitting: STUDENT IN AN ORGANIZED HEALTH CARE EDUCATION/TRAINING PROGRAM

## 2022-09-09 ENCOUNTER — ANESTHESIA (OUTPATIENT)
Dept: PERIOP | Facility: HOSPITAL | Age: 77
End: 2022-09-09

## 2022-09-09 DIAGNOSIS — N40.1 BPH WITH OBSTRUCTION/LOWER URINARY TRACT SYMPTOMS: ICD-10-CM

## 2022-09-09 DIAGNOSIS — N13.8 BPH WITH OBSTRUCTION/LOWER URINARY TRACT SYMPTOMS: ICD-10-CM

## 2022-09-09 PROBLEM — C61 PROSTATE CANCER: Status: ACTIVE | Noted: 2022-09-09

## 2022-09-09 LAB
ABO GROUP BLD: NORMAL
ANION GAP SERPL CALCULATED.3IONS-SCNC: 9 MMOL/L (ref 5–15)
BUN SERPL-MCNC: 18 MG/DL (ref 8–23)
BUN/CREAT SERPL: 26.1 (ref 7–25)
CALCIUM SPEC-SCNC: 9.1 MG/DL (ref 8.6–10.5)
CHLORIDE SERPL-SCNC: 102 MMOL/L (ref 98–107)
CO2 SERPL-SCNC: 27 MMOL/L (ref 22–29)
CREAT SERPL-MCNC: 0.69 MG/DL (ref 0.76–1.27)
DEPRECATED RDW RBC AUTO: 48.1 FL (ref 37–54)
EGFRCR SERPLBLD CKD-EPI 2021: 94.7 ML/MIN/1.73
ERYTHROCYTE [DISTWIDTH] IN BLOOD BY AUTOMATED COUNT: 13.2 % (ref 12.3–15.4)
GLUCOSE SERPL-MCNC: 140 MG/DL (ref 65–99)
HCT VFR BLD AUTO: 40.3 % (ref 37.5–51)
HGB BLD-MCNC: 13.3 G/DL (ref 13–17.7)
MCH RBC QN AUTO: 32.7 PG (ref 26.6–33)
MCHC RBC AUTO-ENTMCNC: 33 G/DL (ref 31.5–35.7)
MCV RBC AUTO: 99 FL (ref 79–97)
PLATELET # BLD AUTO: 312 10*3/MM3 (ref 140–450)
PMV BLD AUTO: 8.9 FL (ref 6–12)
POTASSIUM SERPL-SCNC: 3.9 MMOL/L (ref 3.5–5.2)
RBC # BLD AUTO: 4.07 10*6/MM3 (ref 4.14–5.8)
RH BLD: POSITIVE
SODIUM SERPL-SCNC: 138 MMOL/L (ref 136–145)
WBC NRBC COR # BLD: 8.51 10*3/MM3 (ref 3.4–10.8)

## 2022-09-09 PROCEDURE — 55899 UNLISTED PX MALE GENITAL SYS: CPT | Performed by: STUDENT IN AN ORGANIZED HEALTH CARE EDUCATION/TRAINING PROGRAM

## 2022-09-09 PROCEDURE — 25010000002 ONDANSETRON PER 1 MG: Performed by: NURSE ANESTHETIST, CERTIFIED REGISTERED

## 2022-09-09 PROCEDURE — 88344 IMHCHEM/IMCYTCHM EA MLT ANTB: CPT | Performed by: STUDENT IN AN ORGANIZED HEALTH CARE EDUCATION/TRAINING PROGRAM

## 2022-09-09 PROCEDURE — 86900 BLOOD TYPING SEROLOGIC ABO: CPT

## 2022-09-09 PROCEDURE — 25010000002 PROPOFOL 10 MG/ML EMULSION: Performed by: NURSE ANESTHETIST, CERTIFIED REGISTERED

## 2022-09-09 PROCEDURE — 25010000002 DEXAMETHASONE SODIUM PHOSPHATE 10 MG/ML SOLUTION: Performed by: ANESTHESIOLOGY

## 2022-09-09 PROCEDURE — 25010000002 CEFAZOLIN IN DEXTROSE 2-4 GM/100ML-% SOLUTION: Performed by: STUDENT IN AN ORGANIZED HEALTH CARE EDUCATION/TRAINING PROGRAM

## 2022-09-09 PROCEDURE — 8E0W4CZ ROBOTIC ASSISTED PROCEDURE OF TRUNK REGION, PERCUTANEOUS ENDOSCOPIC APPROACH: ICD-10-PCS | Performed by: STUDENT IN AN ORGANIZED HEALTH CARE EDUCATION/TRAINING PROGRAM

## 2022-09-09 PROCEDURE — 88307 TISSUE EXAM BY PATHOLOGIST: CPT | Performed by: STUDENT IN AN ORGANIZED HEALTH CARE EDUCATION/TRAINING PROGRAM

## 2022-09-09 PROCEDURE — 55899 UNLISTED PX MALE GENITAL SYS: CPT | Performed by: UROLOGY

## 2022-09-09 PROCEDURE — 85027 COMPLETE CBC AUTOMATED: CPT | Performed by: STUDENT IN AN ORGANIZED HEALTH CARE EDUCATION/TRAINING PROGRAM

## 2022-09-09 PROCEDURE — 25010000002 DEXAMETHASONE PER 1 MG: Performed by: NURSE ANESTHETIST, CERTIFIED REGISTERED

## 2022-09-09 PROCEDURE — 80048 BASIC METABOLIC PNL TOTAL CA: CPT | Performed by: STUDENT IN AN ORGANIZED HEALTH CARE EDUCATION/TRAINING PROGRAM

## 2022-09-09 PROCEDURE — 0VT04ZZ RESECTION OF PROSTATE, PERCUTANEOUS ENDOSCOPIC APPROACH: ICD-10-PCS | Performed by: STUDENT IN AN ORGANIZED HEALTH CARE EDUCATION/TRAINING PROGRAM

## 2022-09-09 PROCEDURE — 25010000002 FENTANYL CITRATE (PF) 50 MCG/ML SOLUTION: Performed by: NURSE ANESTHETIST, CERTIFIED REGISTERED

## 2022-09-09 PROCEDURE — 86901 BLOOD TYPING SEROLOGIC RH(D): CPT

## 2022-09-09 DEVICE — FLOSEAL HEMOSTATIC MATRIX, 10ML
Type: IMPLANTABLE DEVICE | Site: ABDOMEN | Status: FUNCTIONAL
Brand: FLOSEAL HEMOSTATIC MATRIX

## 2022-09-09 DEVICE — ABSORBABLE WOUND CLOSURE DEVICE
Type: IMPLANTABLE DEVICE | Site: ABDOMEN | Status: FUNCTIONAL
Brand: V-LOC 180

## 2022-09-09 DEVICE — ABSORBABLE WOUND CLOSURE DEVICE
Type: IMPLANTABLE DEVICE | Site: ABDOMEN | Status: FUNCTIONAL
Brand: V-LOC 90

## 2022-09-09 RX ORDER — ROCURONIUM BROMIDE 10 MG/ML
INJECTION, SOLUTION INTRAVENOUS AS NEEDED
Status: DISCONTINUED | OUTPATIENT
Start: 2022-09-09 | End: 2022-09-09 | Stop reason: SURG

## 2022-09-09 RX ORDER — MEPERIDINE HYDROCHLORIDE 25 MG/ML
12.5 INJECTION INTRAMUSCULAR; INTRAVENOUS; SUBCUTANEOUS
Status: DISCONTINUED | OUTPATIENT
Start: 2022-09-09 | End: 2022-09-09 | Stop reason: HOSPADM

## 2022-09-09 RX ORDER — GABAPENTIN 300 MG/1
600 CAPSULE ORAL ONCE
Status: COMPLETED | OUTPATIENT
Start: 2022-09-09 | End: 2022-09-09

## 2022-09-09 RX ORDER — DROPERIDOL 2.5 MG/ML
0.62 INJECTION, SOLUTION INTRAMUSCULAR; INTRAVENOUS
Status: DISCONTINUED | OUTPATIENT
Start: 2022-09-09 | End: 2022-09-09 | Stop reason: HOSPADM

## 2022-09-09 RX ORDER — PROMETHAZINE HYDROCHLORIDE 25 MG/1
25 TABLET ORAL ONCE AS NEEDED
Status: DISCONTINUED | OUTPATIENT
Start: 2022-09-09 | End: 2022-09-09 | Stop reason: HOSPADM

## 2022-09-09 RX ORDER — DIAPER,BRIEF,INFANT-TODD,DISP
1 EACH MISCELLANEOUS EVERY 12 HOURS SCHEDULED
Status: DISCONTINUED | OUTPATIENT
Start: 2022-09-09 | End: 2022-09-10 | Stop reason: HOSPADM

## 2022-09-09 RX ORDER — MAGNESIUM HYDROXIDE 1200 MG/15ML
LIQUID ORAL AS NEEDED
Status: DISCONTINUED | OUTPATIENT
Start: 2022-09-09 | End: 2022-09-09 | Stop reason: HOSPADM

## 2022-09-09 RX ORDER — CEFAZOLIN SODIUM 2 G/100ML
2 INJECTION, SOLUTION INTRAVENOUS EVERY 8 HOURS
Status: COMPLETED | OUTPATIENT
Start: 2022-09-09 | End: 2022-09-10

## 2022-09-09 RX ORDER — DEXAMETHASONE SODIUM PHOSPHATE 10 MG/ML
INJECTION, SOLUTION INTRAMUSCULAR; INTRAVENOUS
Status: COMPLETED | OUTPATIENT
Start: 2022-09-09 | End: 2022-09-09

## 2022-09-09 RX ORDER — HYDRALAZINE HYDROCHLORIDE 20 MG/ML
5 INJECTION INTRAMUSCULAR; INTRAVENOUS
Status: DISCONTINUED | OUTPATIENT
Start: 2022-09-09 | End: 2022-09-09 | Stop reason: HOSPADM

## 2022-09-09 RX ORDER — TETRACAINE HYDROCHLORIDE 5 MG/ML
2 SOLUTION OPHTHALMIC ONCE
Status: COMPLETED | OUTPATIENT
Start: 2022-09-09 | End: 2022-09-09

## 2022-09-09 RX ORDER — MIDAZOLAM HYDROCHLORIDE 1 MG/ML
0.5 INJECTION INTRAMUSCULAR; INTRAVENOUS
Status: DISCONTINUED | OUTPATIENT
Start: 2022-09-09 | End: 2022-09-09 | Stop reason: HOSPADM

## 2022-09-09 RX ORDER — LABETALOL HYDROCHLORIDE 5 MG/ML
5 INJECTION, SOLUTION INTRAVENOUS
Status: DISCONTINUED | OUTPATIENT
Start: 2022-09-09 | End: 2022-09-09 | Stop reason: HOSPADM

## 2022-09-09 RX ORDER — ACETAMINOPHEN 500 MG
1000 TABLET ORAL ONCE
Status: COMPLETED | OUTPATIENT
Start: 2022-09-09 | End: 2022-09-09

## 2022-09-09 RX ORDER — BUPIVACAINE HYDROCHLORIDE 2.5 MG/ML
INJECTION, SOLUTION EPIDURAL; INFILTRATION; INTRACAUDAL
Status: COMPLETED | OUTPATIENT
Start: 2022-09-09 | End: 2022-09-09

## 2022-09-09 RX ORDER — SODIUM CHLORIDE, SODIUM LACTATE, POTASSIUM CHLORIDE, CALCIUM CHLORIDE 600; 310; 30; 20 MG/100ML; MG/100ML; MG/100ML; MG/100ML
9 INJECTION, SOLUTION INTRAVENOUS CONTINUOUS
Status: DISCONTINUED | OUTPATIENT
Start: 2022-09-09 | End: 2022-09-09

## 2022-09-09 RX ORDER — ACETAMINOPHEN 650 MG/1
650 SUPPOSITORY RECTAL EVERY 6 HOURS
Status: DISCONTINUED | OUTPATIENT
Start: 2022-09-09 | End: 2022-09-10 | Stop reason: HOSPADM

## 2022-09-09 RX ORDER — ONDANSETRON 2 MG/ML
4 INJECTION INTRAMUSCULAR; INTRAVENOUS EVERY 6 HOURS PRN
Status: DISCONTINUED | OUTPATIENT
Start: 2022-09-09 | End: 2022-09-10 | Stop reason: HOSPADM

## 2022-09-09 RX ORDER — HYDROCODONE BITARTRATE AND ACETAMINOPHEN 5; 325 MG/1; MG/1
1 TABLET ORAL ONCE AS NEEDED
Status: DISCONTINUED | OUTPATIENT
Start: 2022-09-09 | End: 2022-09-09 | Stop reason: HOSPADM

## 2022-09-09 RX ORDER — SODIUM CHLORIDE 0.9 % (FLUSH) 0.9 %
10 SYRINGE (ML) INJECTION AS NEEDED
Status: DISCONTINUED | OUTPATIENT
Start: 2022-09-09 | End: 2022-09-09 | Stop reason: HOSPADM

## 2022-09-09 RX ORDER — GABAPENTIN 100 MG/1
100 CAPSULE ORAL EVERY 12 HOURS SCHEDULED
Status: DISCONTINUED | OUTPATIENT
Start: 2022-09-09 | End: 2022-09-10 | Stop reason: HOSPADM

## 2022-09-09 RX ORDER — FAMOTIDINE 20 MG/1
20 TABLET, FILM COATED ORAL ONCE
Status: COMPLETED | OUTPATIENT
Start: 2022-09-09 | End: 2022-09-09

## 2022-09-09 RX ORDER — OXYBUTYNIN CHLORIDE 10 MG/1
10 TABLET, EXTENDED RELEASE ORAL DAILY
Status: DISCONTINUED | OUTPATIENT
Start: 2022-09-09 | End: 2022-09-10 | Stop reason: HOSPADM

## 2022-09-09 RX ORDER — PROPOFOL 10 MG/ML
VIAL (ML) INTRAVENOUS AS NEEDED
Status: DISCONTINUED | OUTPATIENT
Start: 2022-09-09 | End: 2022-09-09 | Stop reason: SURG

## 2022-09-09 RX ORDER — ACETAMINOPHEN 160 MG/5ML
650 SOLUTION ORAL EVERY 6 HOURS
Status: DISCONTINUED | OUTPATIENT
Start: 2022-09-09 | End: 2022-09-10 | Stop reason: HOSPADM

## 2022-09-09 RX ORDER — NALOXONE HCL 0.4 MG/ML
0.4 VIAL (ML) INJECTION AS NEEDED
Status: DISCONTINUED | OUTPATIENT
Start: 2022-09-09 | End: 2022-09-09 | Stop reason: HOSPADM

## 2022-09-09 RX ORDER — SODIUM CHLORIDE 9 MG/ML
INJECTION, SOLUTION INTRAVENOUS AS NEEDED
Status: DISCONTINUED | OUTPATIENT
Start: 2022-09-09 | End: 2022-09-09 | Stop reason: HOSPADM

## 2022-09-09 RX ORDER — SODIUM CHLORIDE 0.9 % (FLUSH) 0.9 %
3 SYRINGE (ML) INJECTION EVERY 12 HOURS SCHEDULED
Status: DISCONTINUED | OUTPATIENT
Start: 2022-09-09 | End: 2022-09-09 | Stop reason: HOSPADM

## 2022-09-09 RX ORDER — ENOXAPARIN SODIUM 100 MG/ML
40 INJECTION SUBCUTANEOUS DAILY
Status: DISCONTINUED | OUTPATIENT
Start: 2022-09-10 | End: 2022-09-10 | Stop reason: HOSPADM

## 2022-09-09 RX ORDER — SODIUM CHLORIDE 9 MG/ML
75 INJECTION, SOLUTION INTRAVENOUS CONTINUOUS
Status: DISCONTINUED | OUTPATIENT
Start: 2022-09-09 | End: 2022-09-10

## 2022-09-09 RX ORDER — ACETAMINOPHEN 325 MG/1
650 TABLET ORAL EVERY 6 HOURS
Status: DISCONTINUED | OUTPATIENT
Start: 2022-09-09 | End: 2022-09-10 | Stop reason: HOSPADM

## 2022-09-09 RX ORDER — CEFAZOLIN SODIUM 2 G/100ML
2 INJECTION, SOLUTION INTRAVENOUS ONCE
Status: COMPLETED | OUTPATIENT
Start: 2022-09-09 | End: 2022-09-09

## 2022-09-09 RX ORDER — FENTANYL CITRATE 50 UG/ML
50 INJECTION, SOLUTION INTRAMUSCULAR; INTRAVENOUS
Status: DISCONTINUED | OUTPATIENT
Start: 2022-09-09 | End: 2022-09-09 | Stop reason: HOSPADM

## 2022-09-09 RX ORDER — DROPERIDOL 2.5 MG/ML
0.62 INJECTION, SOLUTION INTRAMUSCULAR; INTRAVENOUS ONCE AS NEEDED
Status: DISCONTINUED | OUTPATIENT
Start: 2022-09-09 | End: 2022-09-09 | Stop reason: HOSPADM

## 2022-09-09 RX ORDER — EPHEDRINE SULFATE 50 MG/ML
INJECTION, SOLUTION INTRAVENOUS AS NEEDED
Status: DISCONTINUED | OUTPATIENT
Start: 2022-09-09 | End: 2022-09-09 | Stop reason: SURG

## 2022-09-09 RX ORDER — SODIUM CHLORIDE 0.9 % (FLUSH) 0.9 %
3-10 SYRINGE (ML) INJECTION AS NEEDED
Status: DISCONTINUED | OUTPATIENT
Start: 2022-09-09 | End: 2022-09-09 | Stop reason: HOSPADM

## 2022-09-09 RX ORDER — NALOXONE HCL 0.4 MG/ML
0.1 VIAL (ML) INJECTION
Status: DISCONTINUED | OUTPATIENT
Start: 2022-09-09 | End: 2022-09-10 | Stop reason: HOSPADM

## 2022-09-09 RX ORDER — IPRATROPIUM BROMIDE AND ALBUTEROL SULFATE 2.5; .5 MG/3ML; MG/3ML
3 SOLUTION RESPIRATORY (INHALATION) ONCE AS NEEDED
Status: DISCONTINUED | OUTPATIENT
Start: 2022-09-09 | End: 2022-09-09 | Stop reason: HOSPADM

## 2022-09-09 RX ORDER — PROMETHAZINE HYDROCHLORIDE 25 MG/1
25 SUPPOSITORY RECTAL ONCE AS NEEDED
Status: DISCONTINUED | OUTPATIENT
Start: 2022-09-09 | End: 2022-09-09 | Stop reason: HOSPADM

## 2022-09-09 RX ORDER — DEXAMETHASONE SODIUM PHOSPHATE 4 MG/ML
INJECTION, SOLUTION INTRA-ARTICULAR; INTRALESIONAL; INTRAMUSCULAR; INTRAVENOUS; SOFT TISSUE AS NEEDED
Status: DISCONTINUED | OUTPATIENT
Start: 2022-09-09 | End: 2022-09-09 | Stop reason: SURG

## 2022-09-09 RX ORDER — SODIUM CHLORIDE 0.9 % (FLUSH) 0.9 %
10 SYRINGE (ML) INJECTION EVERY 12 HOURS SCHEDULED
Status: DISCONTINUED | OUTPATIENT
Start: 2022-09-09 | End: 2022-09-09 | Stop reason: HOSPADM

## 2022-09-09 RX ORDER — LIDOCAINE HYDROCHLORIDE 10 MG/ML
0.5 INJECTION, SOLUTION EPIDURAL; INFILTRATION; INTRACAUDAL; PERINEURAL ONCE AS NEEDED
Status: COMPLETED | OUTPATIENT
Start: 2022-09-09 | End: 2022-09-09

## 2022-09-09 RX ORDER — ONDANSETRON 2 MG/ML
4 INJECTION INTRAMUSCULAR; INTRAVENOUS ONCE AS NEEDED
Status: DISCONTINUED | OUTPATIENT
Start: 2022-09-09 | End: 2022-09-09 | Stop reason: HOSPADM

## 2022-09-09 RX ORDER — FENTANYL CITRATE 50 UG/ML
INJECTION, SOLUTION INTRAMUSCULAR; INTRAVENOUS AS NEEDED
Status: DISCONTINUED | OUTPATIENT
Start: 2022-09-09 | End: 2022-09-09 | Stop reason: SURG

## 2022-09-09 RX ORDER — HYDROMORPHONE HYDROCHLORIDE 1 MG/ML
0.5 INJECTION, SOLUTION INTRAMUSCULAR; INTRAVENOUS; SUBCUTANEOUS
Status: DISCONTINUED | OUTPATIENT
Start: 2022-09-09 | End: 2022-09-09 | Stop reason: HOSPADM

## 2022-09-09 RX ORDER — ONDANSETRON 2 MG/ML
INJECTION INTRAMUSCULAR; INTRAVENOUS AS NEEDED
Status: DISCONTINUED | OUTPATIENT
Start: 2022-09-09 | End: 2022-09-09 | Stop reason: SURG

## 2022-09-09 RX ORDER — MELOXICAM 15 MG/1
15 TABLET ORAL ONCE
Status: COMPLETED | OUTPATIENT
Start: 2022-09-09 | End: 2022-09-09

## 2022-09-09 RX ORDER — OXYCODONE HYDROCHLORIDE 5 MG/1
5 TABLET ORAL EVERY 4 HOURS PRN
Status: DISCONTINUED | OUTPATIENT
Start: 2022-09-09 | End: 2022-09-10 | Stop reason: HOSPADM

## 2022-09-09 RX ORDER — HYOSCYAMINE SULFATE 0.125 MG
125 TABLET,DISINTEGRATING ORAL EVERY 4 HOURS PRN
Status: DISCONTINUED | OUTPATIENT
Start: 2022-09-09 | End: 2022-09-10 | Stop reason: HOSPADM

## 2022-09-09 RX ORDER — HYDROMORPHONE HYDROCHLORIDE 1 MG/ML
0.5 INJECTION, SOLUTION INTRAMUSCULAR; INTRAVENOUS; SUBCUTANEOUS
Status: DISCONTINUED | OUTPATIENT
Start: 2022-09-09 | End: 2022-09-10 | Stop reason: HOSPADM

## 2022-09-09 RX ORDER — ONDANSETRON 4 MG/1
4 TABLET, FILM COATED ORAL EVERY 6 HOURS PRN
Status: DISCONTINUED | OUTPATIENT
Start: 2022-09-09 | End: 2022-09-10 | Stop reason: HOSPADM

## 2022-09-09 RX ORDER — AMOXICILLIN 250 MG
2 CAPSULE ORAL 2 TIMES DAILY
Status: DISCONTINUED | OUTPATIENT
Start: 2022-09-09 | End: 2022-09-10 | Stop reason: HOSPADM

## 2022-09-09 RX ORDER — LIDOCAINE HYDROCHLORIDE 10 MG/ML
INJECTION, SOLUTION EPIDURAL; INFILTRATION; INTRACAUDAL; PERINEURAL AS NEEDED
Status: DISCONTINUED | OUTPATIENT
Start: 2022-09-09 | End: 2022-09-09 | Stop reason: SURG

## 2022-09-09 RX ADMIN — CEFAZOLIN SODIUM 2 G: 2 INJECTION, SOLUTION INTRAVENOUS at 07:50

## 2022-09-09 RX ADMIN — FENTANYL CITRATE 50 MCG: 50 INJECTION, SOLUTION INTRAMUSCULAR; INTRAVENOUS at 07:45

## 2022-09-09 RX ADMIN — ROCURONIUM BROMIDE 30 MG: 50 INJECTION, SOLUTION INTRAVENOUS at 07:45

## 2022-09-09 RX ADMIN — PROPOFOL 50 MG: 10 INJECTION, EMULSION INTRAVENOUS at 08:24

## 2022-09-09 RX ADMIN — ACETAMINOPHEN 1000 MG: 500 TABLET, FILM COATED ORAL at 07:12

## 2022-09-09 RX ADMIN — PROPOFOL 25 MCG/KG/MIN: 10 INJECTION, EMULSION INTRAVENOUS at 08:31

## 2022-09-09 RX ADMIN — ACETAMINOPHEN 650 MG: 325 TABLET, FILM COATED ORAL at 13:46

## 2022-09-09 RX ADMIN — ROCURONIUM BROMIDE 20 MG: 50 INJECTION, SOLUTION INTRAVENOUS at 08:36

## 2022-09-09 RX ADMIN — DEXAMETHASONE SODIUM PHOSPHATE 6 MG: 4 INJECTION, SOLUTION INTRA-ARTICULAR; INTRALESIONAL; INTRAMUSCULAR; INTRAVENOUS; SOFT TISSUE at 08:38

## 2022-09-09 RX ADMIN — PROPOFOL 120 MG: 10 INJECTION, EMULSION INTRAVENOUS at 07:45

## 2022-09-09 RX ADMIN — SENNOSIDES AND DOCUSATE SODIUM 2 TABLET: 50; 8.6 TABLET ORAL at 13:46

## 2022-09-09 RX ADMIN — FENTANYL CITRATE 50 MCG: 50 INJECTION, SOLUTION INTRAMUSCULAR; INTRAVENOUS at 07:55

## 2022-09-09 RX ADMIN — SUGAMMADEX 200 MG: 100 INJECTION, SOLUTION INTRAVENOUS at 10:44

## 2022-09-09 RX ADMIN — SODIUM CHLORIDE, POTASSIUM CHLORIDE, SODIUM LACTATE AND CALCIUM CHLORIDE: 600; 310; 30; 20 INJECTION, SOLUTION INTRAVENOUS at 08:24

## 2022-09-09 RX ADMIN — ONDANSETRON 4 MG: 2 INJECTION INTRAMUSCULAR; INTRAVENOUS at 08:38

## 2022-09-09 RX ADMIN — GABAPENTIN 100 MG: 100 CAPSULE ORAL at 21:26

## 2022-09-09 RX ADMIN — FAMOTIDINE 20 MG: 20 TABLET ORAL at 07:13

## 2022-09-09 RX ADMIN — MELOXICAM 15 MG: 15 TABLET ORAL at 07:12

## 2022-09-09 RX ADMIN — LIDOCAINE HYDROCHLORIDE 50 MG: 10 INJECTION, SOLUTION EPIDURAL; INFILTRATION; INTRACAUDAL; PERINEURAL at 07:45

## 2022-09-09 RX ADMIN — CEFAZOLIN SODIUM 2 G: 2 INJECTION, SOLUTION INTRAVENOUS at 17:04

## 2022-09-09 RX ADMIN — DEXAMETHASONE SODIUM PHOSPHATE 4 MG: 10 INJECTION, SOLUTION INTRAMUSCULAR; INTRAVENOUS at 07:44

## 2022-09-09 RX ADMIN — SODIUM CHLORIDE, POTASSIUM CHLORIDE, SODIUM LACTATE AND CALCIUM CHLORIDE 9 ML/HR: 600; 310; 30; 20 INJECTION, SOLUTION INTRAVENOUS at 07:13

## 2022-09-09 RX ADMIN — BUPIVACAINE HYDROCHLORIDE 60 ML: 2.5 INJECTION, SOLUTION EPIDURAL; INFILTRATION; INTRACAUDAL; PERINEURAL at 07:44

## 2022-09-09 RX ADMIN — ACETAMINOPHEN 650 MG: 325 TABLET, FILM COATED ORAL at 21:25

## 2022-09-09 RX ADMIN — SENNOSIDES AND DOCUSATE SODIUM 2 TABLET: 50; 8.6 TABLET ORAL at 21:25

## 2022-09-09 RX ADMIN — PROPOFOL 30 MG: 10 INJECTION, EMULSION INTRAVENOUS at 07:48

## 2022-09-09 RX ADMIN — EPHEDRINE SULFATE 10 MG: 50 INJECTION INTRAVENOUS at 08:15

## 2022-09-09 RX ADMIN — TETRACAINE HYDROCHLORIDE 2 DROP: 5 SOLUTION OPHTHALMIC at 17:04

## 2022-09-09 RX ADMIN — OXYBUTYNIN CHLORIDE 10 MG: 10 TABLET, EXTENDED RELEASE ORAL at 13:46

## 2022-09-09 RX ADMIN — OXYCODONE 5 MG: 5 TABLET ORAL at 21:25

## 2022-09-09 RX ADMIN — BACITRACIN 1 APPLICATION: 500 OINTMENT TOPICAL at 21:26

## 2022-09-09 RX ADMIN — GABAPENTIN 100 MG: 100 CAPSULE ORAL at 13:46

## 2022-09-09 RX ADMIN — LIDOCAINE HYDROCHLORIDE 0.5 ML: 10 INJECTION, SOLUTION EPIDURAL; INFILTRATION; INTRACAUDAL; PERINEURAL at 07:12

## 2022-09-09 RX ADMIN — GABAPENTIN 600 MG: 300 CAPSULE ORAL at 07:12

## 2022-09-09 NOTE — OP NOTE
PROSTATECTOMY LAPAROSCOPIC SIMPLE WITH DAVINCI ROBOT  Procedure Report    Patient Name:  Wade Mcmanus  YOB: 1945    Date of Surgery:  9/9/2022     Indications: The patient is a 77-year-old male with extreme BPH, refractory lower urinary tract symptoms incomplete bladder emptying who elects to proceed to the operating room today for robotic simple prostatectomy.  Informed consent was reviewed and signed.    Pre-op Diagnosis:   BPH with obstruction/lower urinary tract symptoms [N40.1, N13.8]       Post-Op Diagnosis Codes:     * BPH with obstruction/lower urinary tract symptoms [N40.1, N13.8]    Procedure(s):  ROBOTIC ASSISTED LAPAROSCOPIC SIMPLE PROSTATECTOMY     Staff:  Surgeon(s):  Jose Mercado MD Stark, Timothy, MD    Modifier 80: Dr. Abelardo Guerrier served as my surgical assistant for this case, his expertise in obtaining abdominal access, port placement, retraction, suctioning, specimen extraction, skin closure were necessary for the safe completion of this case.    Anesthesia: General with Block    Estimated Blood Loss: 75 mL    Implants:    Implant Name Type Inv. Item Serial No.  Lot No. LRB No. Used Action   DEV CLS WND VLOC/180 NEGRA ABS 1/2CIR SZ2/0 26MM 30CM GRN - [id number] Implant DEV CLS WND VLOC/180 NEGRA ABS 1/2CIR SZ2/0 26MM 30CM [facility]  COVIDIEN [id number] N/A 1 Implanted   DEV CLS WND VLOC/90 NEGRA ABS 5/8CIR SZ3/0 27MM 23CM BLANCA - [id number] Implant DEV CLS WND VLOC/90 NEGRA ABS 5/8CIR SZ3/0 27MM 23CM BLANCA  COVIDIEN [id number] N/A 2 Implanted   KT SEAL HEMOS ABS FLOSEAL MATRX FAST/PREP 10ML - [id number] Implant KT SEAL HEMOS ABS FLOSEAL MATRX FAST/PREP 10ML  [facility] [id number] N/A 1 Implanted       Specimen:          Specimens     ID Source Type Tests Collected By Collected At Frozen?    A Prostate Tissue · TISSUE PATHOLOGY EXAM   Jose Mercado MD 9/9/22 1003 No    This specimen was not marked as sent.          Drains: 22 Fr 3-way erazo catheter      Findings:    1.  Extreme BPH with intravesical protrusion, uncomplicated robotic simple prostatectomy with circumferential re-mucosalization at bladder neck     Complications: None    Description of Procedure:     The patient was seen and identified in the pre-operative area. Informed consent was verified after repeat discussion regarding risks and benefits of operation (detailed in pre-op H+P).  He was seen taken to the operating room per anesthesia and laid in supine position on the operating table. SCDs were placed in bilateral lower extremities and were cycling. Pre operative IV Ancef was administered. The patient was intubated and general anesthesia was introduced. Once adequate anesthesia was obtained, he was placed in a low lithotomy position. A brief time-out was performed confirming correct patient, procedure, and laterality. The abdomen and genitals were prepped and draped in sterile fashion.    A 20 Fr erazo catheter was placed in sterile fashion.     The 11 blade scalpel was used to make a stab incision above the umbilicus. The Veress needle was used to gain access above the umbilicus. Drop test confirmed placement and access was obtained without difficulty.  Pneumoperitoneum was increased to 15 mmHg.  The supraumbilical incision was extended and non-bladed 8mm robotic visual camera port was then placed under direct vision. 3 robotic ports and two assistant ports, one 5 mm port in the right upper quadrant and one 12 mm assistant port at the right anterior abdomen were placed under direct vision without difficulty. The 12 mm assistant port was temporarily removed and the [employer] device was used to pre-place a closure suture of 0-Vicryl at the assistant port. The 12 mm port was readvanced. The patient was then placed in steep Trendelenburg position. The abdomen was inspected. The robot was then docked.     Left sigmoid colon pelvic adhesions were taken down sharply to gain adequate exposure.       Next the  assistant fill the bladder with 200 cc of sterile saline.  The catheter was clamped.  The bladder was distended and a roughly 6 cm incision was made over the posterior bladder through the mucosal layer.  The bladder was entered and all fluid was drained with a suction device.  Next a 2-0 Prolene on a Danny needle was advanced through the right lower abdomen into the peritoneal space, the Danny needle was inserted through the bladder wall at 2 points for retraction, the Danny needle was then delivered back out through the right lower abdomen and the assistant grasped this and clamped this at the skin for bladder retraction.  The same was performed on the left side.    With the bladder appropriately retracted, we entered into the bladder space and identify the bilateral ureteral orifices at the trigone.  A circumferential incision was made overlying the intravesical prostate mucosa avoiding the ureters.  This incision was carried through the thickened detrusor muscle fibers until the glistening white prostate adenoma was identified.  I used the [place] dissector and bipolar cautery to carefully free the adenoma anteriorly and around the lateral sides.  I then used a robotic tenaculum to grasp of the prostate adenoma and retract this superiorly.  I began my posterior dissection around the adenoma in similar fashion with bipolar cautery, monopolar cautery and the [place] dissector.  We carefully retracted the prostate sequentially out of the pelvis continuing our dissection circumferentially around the prostate gland.  We identified some nodularity around the lateral aspects of the lateral lobe and These on the specimen.  Eventually I was able to dissect out the entire adenoma with minimal bleeding, preserving the prostatic capsule which appeared to be intact throughout the dissection.  Eventually we were able to come around the apex of the prostate and I eventually transected the prostatic urethra and the entire  specimen was freed and dropped back into the abdomen.    Catheter was reinserted by the assistant identifying the location of the transected prostatic urethra.  I then used a 3-0 Vloc on a UR-6 needle to re-mucosalize the cut edge of the bladder neck to the transected prostatic urethra.  This was performed in a circumferential manner starting at the 12 o'clock position and moving around to the 6 o'clock position on the right side.  A second stitch beginning at the 12 o'clock position was then run to the 6 o'clock position starting at the left side.  Eventually the entire bladder neck was remucosalized to the prostatic urethra.  Prior to complete closure, 10 cc of Floseal were inserted into the space between the bladder neck mucosa and the prostatic capsular space.  This was performed to promote hemostasis  Once re-mucosalization was performed the final catheter, a 22 Wolof three-way Christine catheter was inserted into the bladder and 5 cc of sterile water used inflate the balloon.  I again checked the ureters and no injuries were identified.  Clear yellow urine was effluxing from the bilateral ureters which were peristalsing easily.    Next I began the bladder closure starting at the apex of the incision superiorly and closing this with a 2-0 Vloc suture in a running fashion all the way to the inferior aspect of the incision.  25 more cc of sterile water used inflate the catheter balloon.  The assistant filled the bladder with 200 cc of sterile saline and this appeared to be a watertight closure.  No leaks were identified.    Next CBI was initiated.     Next, a 10 flat KAITLYNN drain was brought in through the left lateral fourth arm port and placed into the deep pelvis.    A 12 mm specimen bag was brought into the assistant port and all prostate adenoma specimen was placed into the bag and this was closed.    The robot was then undocked.  The supraumbilical incision was extended with the Bovie and the fascia extended over  a finger avoiding bowel injury. The specimen in the Endo Catch bag was removed through this extraction incision above the umbilicus.  The fascia was closed with multiple 0-Vicryl sutures in a figure-of-eight fashion. The rest of the ports were irrigated out and the skin was closed with 4-0 Monocryl in a subcuticular fashion.  The right lower quadrant assistant port previously placed 0 Vicryl suture was tied down.  Dermabond was then applied over the incisions. The patient tolerated the procedure well. He was extubated and transferred to PACU in stable condition.    DISPOSITION:   The patient will be admitted to [facility] floor for overnight observation with possible discharge in the morning pending tolerance of diet, pain control and ambulation.  KAITLYNN drain will be removed prior to discharge pending appropriate output.  Christine catheter will remain in place for 7 days and the patient will follow back up with me in 1 week for catheter removal and trial of void.      Jose Mercado MD     Date: 9/9/2022  Time: 14:23 EDT

## 2022-09-09 NOTE — ANESTHESIA PROCEDURE NOTES
Peripheral Block      Patient reassessed immediately prior to procedure    Patient location during procedure: OR  Reason for block: at surgeon's request and post-op pain management  Performed by  CRNA/CAA: Prince Devi CRNA  Preanesthetic Checklist  Completed: patient identified, IV checked, site marked, risks and benefits discussed, surgical consent, monitors and equipment checked, pre-op evaluation and timeout performed  Prep:  Pt Position: supine  Sterile barriers:cap, gloves, sterile barriers and mask  Prep: ChloraPrep  Patient monitoring: blood pressure monitoring, continuous pulse oximetry and EKG  Procedure    Sedation: yes  Performed under: general  Guidance:ultrasound guided  Images:still images obtained, printed/placed on chart    Laterality:Bilateral  Block Type:TAP  Injection Technique:single-shot  Needle Type:short-bevel and echogenic  Needle Gauge:20 G  Resistance on Injection: none    Medications Used: bupivacaine PF (MARCAINE) 0.25 % injection, 60 mL  dexamethasone sodium phosphate injection, 4 mg      Medications  Comment:Block Injection:  LA dose divided between Right and Left block        Post Assessment  Injection Assessment: negative aspiration for heme, incremental injection and no paresthesia on injection  Patient Tolerance:comfortable throughout block  Complications:no  Additional Notes      Under Ultrasound guidance, a BBraun 4inch 360 degree needle was advanced with Normal Saline hydro dissection of tissue.  The Internal Oblique and Transversus Abdominus muscles where visualized.  At or before the aponeurosis of Internal Oblique, local anesthetic spread was visualized in the Transversus Abdominus Plane. Injection was made incrementally with aspiration every 5 mls.  There was no  intravascular injection,  injection pressure was normal, there was no neural injection, and the procedure was completed without difficulty.  Thank You.

## 2022-09-09 NOTE — BRIEF OP NOTE
PROSTATECTOMY LAPAROSCOPIC SIMPLE WITH DAVINCI ROBOT  Progress Note    Wade Mcmanus  9/9/2022    Pre-op Diagnosis:   BPH with obstruction/lower urinary tract symptoms [N40.1, N13.8]       Post-Op Diagnosis Codes:     * BPH with obstruction/lower urinary tract symptoms [N40.1, N13.8]      Procedure(s):  PROSTATECTOMY LAPAROSCOPIC SIMPLE WITH DAVINCI ROBOT    Surgeon(s):  Jose Mercado MD Stark, Timothy, MD    Anesthesia: General with Block    Staff:   Circulator: Patty Velazquez RN; Joanne Sanabria RN  Scrub Person: Mary Torres; Ria Payne RN  Nursing Assistant: Ariane Bird PCT         Estimated Blood Loss: 75 mL    Urine Voided: * No values recorded between 9/9/2022  7:40 AM and 9/9/2022 10:53 AM *    Specimens:                Specimens     ID Source Type Tests Collected By Collected At Frozen?    A Prostate Tissue · TISSUE PATHOLOGY EXAM   Jose Mercado MD 9/9/22 1003     This specimen was not marked as sent.                Drains:   Closed/Suction Drain 1 Left;Anterior LLQ 10 Fr. (Active)   Dressing Status Other (Comment) 09/09/22 1037       Urethral Catheter Non-latex 22 Fr. (Active)       Findings: Uncomplicated robotic simple prostatectomy. 22 Fr 3-way on CBI.       Complications: None          Jose Mercado MD     Date: 9/9/2022  Time: 10:55 EDT

## 2022-09-09 NOTE — H&P
Pre-Op H&P    Patient Name: Wade Mcmanus  : 1945  MRN: [id number]    Chief complaint: BPH    HPI:    Patient is a 77 y.o.male who presents with BPH. He originally presented to Dr. Mercado several months ago with hematuria. He said he has not seen blood in his urine recently. Patient had CT urogram done showing benign non-nodular prostatic hyperplasia. Flexible cystourethroscopy then performed by Dr. Mercado. Robotic-assisted laparoscopic prostatectomy recommended.  No changes in symptoms or medical history since last office visit.        Review of Systems:  General ROS: negative for chills, fever or skin lesions;  No changes since last office visit.  Neg for recent sick exposure  Cardiovascular ROS: no chest pain or dyspnea on exertion  Respiratory ROS: no cough, shortness of breath, or wheezing    Allergies:   Allergies   Allergen Reactions   • Amoxicillin Rash   • Clindamycin Rash   • Penicillins Rash   • Sulfa Antibiotics Rash       Home Meds:    No current facility-administered medications on file prior to encounter.     Current Outpatient Medications on File Prior to Encounter   Medication Sig Dispense Refill   • acetaminophen (TYLENOL) 325 MG tablet Take 650 mg by mouth As Needed for Mild Pain .     • aspirin 325 MG tablet Take 1 tablet by mouth 3 (Three) Times a Day. Taking 1  tablet in AM and 1 tablet at noon right now         PMH:   Past Medical History:   Diagnosis Date   • Anxiety    • Enlarged prostate    • History of screening colonoscopy 10/22/2018    Normal colon to cecum. Grade 2 internal hemorrhoids. Repeat recommended at 5 year interval if remains healthy.    • Primary osteoarthritis involving multiple joints 2022   • Primary osteoarthritis of both hips 2022   • Primary osteoarthritis of right knee 2022   • Screening for AAA (abdominal aortic aneurysm) 2019    Patent nonaneurysmal abdominal aorta with mild ectasia of the proximal aortic segment.    • Sleep apnea      IS NOT CPAP/BIPAP COMPLIANT     PSH:    Past Surgical History:   Procedure Laterality Date   • COLONOSCOPY     • HERNIA REPAIR      INGUINAL, PATIENT UNSURE AS TO WHICH SIDE   • KNEE SURGERY Right 2007    knee [facility]rosc[facility]   • TONSILLECTOMY         Immunization History:  Influenza:   COVID:     Social History:   Tobacco:   Social History     Tobacco Use   Smoking Status Former Smoker   • Packs/day: 1.00   • Years: 15.00   • Pack years: 15.00   • Types: Cigarettes   • Start date: 1958   • Quit date: 1973   • Years since quittin.5   Smokeless Tobacco Never Used      Alcohol:     Social History     Substance and Sexual Activity   Alcohol Use Yes    Comment: 7 drinks weekly       Vitals:           There were no vitals taken for this visit.    Physical Exam:  General Appearance:    Alert, cooperative, no distress, appears stated age   Head:    Normocephalic, without obvious abnormality, atraumatic   Lungs:     Clear to auscultation bilaterally, respirations unlabored    Heart:   Regular rate and rhythm, S1 and S2 normal, no murmur, rub    or gallop    Abdomen:    Soft, nontender.  +bowel sounds   Breast Exam:    deferred   Genitalia:    deferred   Extremities:   Extremities normal, atraumatic, no cyanosis or edema   Skin:   Skin color, texture, turgor normal, no rashes or lesions   Neurologic:   Grossly intact   Results Review  LABS:  Lab Results   Component Value Date    WBC 7.91 2022    HGB 13.5 2022    HCT 38.7 2022    MCV 95.8 2022     2022    NEUTROABS 5.5 2022    GLUCOSE 95 2022    BUN 15 2022    CREATININE 0.73 (L) 2022    EGFRIFNONA 110 2019    EGFRIFAFRI 133 2019     2022    K 4.1 2022     2022    CO2 26.0 2022    CALCIUM 9.6 2022    ALBUMIN 4.4 2022    AST 22 2022    ALT 20 2022    BILITOT 0.4 2022       RADIOLOGY:  No radiology  results for the last 3 days     I reviewed the patient's new clinical results.        Impression:   Benign non-nodular prostatic hyperplasia    Plan:   Prostatectomy laparoscopic simple with davinci robot        Electronically signed by Ernestine Bhatti PA-C   09/09/22   6:47 AM EDT

## 2022-09-09 NOTE — PLAN OF CARE
Problem: Adult Inpatient Plan of Care  Goal: Plan of Care Review  Outcome: Ongoing, Progressing  Flowsheets (Taken 9/9/2022 1610)  Outcome Evaluation: JUN CROOKS. Pt recovering from procedure well and tolerating CBI. C/o left eye irritation after surgery, anesthesia notified, eye drops given. Pain controlled at this time. Ambulation and IS use encouraged.   Goal Outcome Evaluation:              Outcome Evaluation: VSSJUN. Pt recovering from procedure well and tolerating CBI. C/o left eye irritation after surgery, anesthesia notified, eye drops given. Pain controlled at this time. Ambulation and IS use encouraged.

## 2022-09-09 NOTE — ANESTHESIA POSTPROCEDURE EVALUATION
Patient: Wade Mcmanus    Procedure Summary     Date: 09/09/22 Room / Location:  [facility] OR  /  [facility]    Anesthesia Start: 0740 Anesthesia Stop:     Procedure: PROSTATECTOMY LAPAROSCOPIC SIMPLE WITH DAVINCI ROBOT (N/A Abdomen) Diagnosis:       BPH with obstruction/lower urinary tract symptoms      (BPH with obstruction/lower urinary tract symptoms [N40.1, N13.8])    Surgeons: Jose Mercado MD Provider: Rudi Ramos MD    Anesthesia Type: general with block ASA Status: 3          Anesthesia Type: general with block    Vitals  Vitals Value Taken Time   /93 09/09/22 1054   Temp 97.9 °F (36.6 °C) 09/09/22 0750   Pulse 76 09/09/22 1056   Resp 16 09/09/22 0750   SpO2 99 % 09/09/22 1056   Vitals shown include unvalidated device data.        Post Anesthesia Care and Evaluation    Patient location during evaluation: PACU  Patient participation: complete - patient participated  Level of consciousness: awake and alert  Pain score: 0  Pain management: adequate    Airway patency: patent  Anesthetic complications: No anesthetic complications  PONV Status: none  Cardiovascular status: hemodynamically stable and acceptable  Respiratory status: nonlabored ventilation, acceptable and nasal cannula  Hydration status: acceptable

## 2022-09-09 NOTE — ANESTHESIA PREPROCEDURE EVALUATION
Anesthesia Evaluation     Patient summary reviewed and Nursing notes reviewed   no history of anesthetic complications:  NPO Solid Status: > 8 hours  NPO Liquid Status: > 2 hours           Airway   Mallampati: II  TM distance: >3 FB  Neck ROM: full  No difficulty expected  Dental - normal exam     Pulmonary - normal exam    breath sounds clear to auscultation  (+) sleep apnea (CPAP intolerant),   Cardiovascular - normal exam    ECG reviewed  Rhythm: regular  Rate: normal    (+) hypertension,       Neuro/Psych- negative ROS  GI/Hepatic/Renal/Endo - negative ROS     Musculoskeletal     Abdominal    Substance History      OB/GYN          Other   arthritis,    history of cancer (Prostate ca)                    Anesthesia Plan    ASA 3     general with block     (Bilateral TAP blocks post-induction for post-operative analgesia per request of Dr. Mercado)  intravenous induction     Anesthetic plan, risks, benefits, and alternatives have been provided, discussed and informed consent has been obtained with: patient.    Plan discussed with CRNA.        CODE STATUS:

## 2022-09-10 ENCOUNTER — READMISSION MANAGEMENT (OUTPATIENT)
Dept: CALL CENTER | Facility: HOSPITAL | Age: 77
End: 2022-09-10

## 2022-09-10 VITALS
HEART RATE: 68 BPM | DIASTOLIC BLOOD PRESSURE: 64 MMHG | RESPIRATION RATE: 16 BRPM | BODY MASS INDEX: 19.39 KG/M2 | WEIGHT: 135.47 LBS | OXYGEN SATURATION: 96 % | HEIGHT: 70 IN | SYSTOLIC BLOOD PRESSURE: 121 MMHG | TEMPERATURE: 97.7 F

## 2022-09-10 LAB
ANION GAP SERPL CALCULATED.3IONS-SCNC: 7 MMOL/L (ref 5–15)
BUN SERPL-MCNC: 10 MG/DL (ref 8–23)
BUN/CREAT SERPL: 18.5 (ref 7–25)
CALCIUM SPEC-SCNC: 8.8 MG/DL (ref 8.6–10.5)
CHLORIDE SERPL-SCNC: 107 MMOL/L (ref 98–107)
CO2 SERPL-SCNC: 27 MMOL/L (ref 22–29)
CREAT SERPL-MCNC: 0.54 MG/DL (ref 0.76–1.27)
DEPRECATED RDW RBC AUTO: 47.6 FL (ref 37–54)
EGFRCR SERPLBLD CKD-EPI 2021: 102 ML/MIN/1.73
ERYTHROCYTE [DISTWIDTH] IN BLOOD BY AUTOMATED COUNT: 13.3 % (ref 12.3–15.4)
GLUCOSE SERPL-MCNC: 99 MG/DL (ref 65–99)
HCT VFR BLD AUTO: 37.2 % (ref 37.5–51)
HGB BLD-MCNC: 12.6 G/DL (ref 13–17.7)
MCH RBC QN AUTO: 33.2 PG (ref 26.6–33)
MCHC RBC AUTO-ENTMCNC: 33.9 G/DL (ref 31.5–35.7)
MCV RBC AUTO: 98.2 FL (ref 79–97)
PLATELET # BLD AUTO: 302 10*3/MM3 (ref 140–450)
PMV BLD AUTO: 9.2 FL (ref 6–12)
POTASSIUM SERPL-SCNC: 4.2 MMOL/L (ref 3.5–5.2)
RBC # BLD AUTO: 3.79 10*6/MM3 (ref 4.14–5.8)
SODIUM SERPL-SCNC: 141 MMOL/L (ref 136–145)
WBC NRBC COR # BLD: 9.65 10*3/MM3 (ref 3.4–10.8)

## 2022-09-10 PROCEDURE — 99024 POSTOP FOLLOW-UP VISIT: CPT | Performed by: STUDENT IN AN ORGANIZED HEALTH CARE EDUCATION/TRAINING PROGRAM

## 2022-09-10 PROCEDURE — 25010000002 CEFAZOLIN IN DEXTROSE 2-4 GM/100ML-% SOLUTION: Performed by: STUDENT IN AN ORGANIZED HEALTH CARE EDUCATION/TRAINING PROGRAM

## 2022-09-10 PROCEDURE — 25010000002 ENOXAPARIN PER 10 MG: Performed by: STUDENT IN AN ORGANIZED HEALTH CARE EDUCATION/TRAINING PROGRAM

## 2022-09-10 PROCEDURE — 85027 COMPLETE CBC AUTOMATED: CPT | Performed by: STUDENT IN AN ORGANIZED HEALTH CARE EDUCATION/TRAINING PROGRAM

## 2022-09-10 PROCEDURE — 97530 THERAPEUTIC ACTIVITIES: CPT

## 2022-09-10 PROCEDURE — 80048 BASIC METABOLIC PNL TOTAL CA: CPT | Performed by: STUDENT IN AN ORGANIZED HEALTH CARE EDUCATION/TRAINING PROGRAM

## 2022-09-10 PROCEDURE — 97161 PT EVAL LOW COMPLEX 20 MIN: CPT

## 2022-09-10 RX ORDER — HYOSCYAMINE SULFATE 0.125 MG
125 TABLET,DISINTEGRATING ORAL EVERY 4 HOURS PRN
Qty: 30 TABLET | Refills: 0 | Status: SHIPPED | OUTPATIENT
Start: 2022-09-10 | End: 2022-09-16

## 2022-09-10 RX ORDER — ONDANSETRON 4 MG/1
4 TABLET, FILM COATED ORAL EVERY 6 HOURS PRN
Qty: 15 TABLET | Refills: 0 | Status: SHIPPED | OUTPATIENT
Start: 2022-09-10 | End: 2022-09-16

## 2022-09-10 RX ORDER — BISACODYL 10 MG
10 SUPPOSITORY, RECTAL RECTAL 2 TIMES DAILY
Status: DISCONTINUED | OUTPATIENT
Start: 2022-09-10 | End: 2022-09-10 | Stop reason: HOSPADM

## 2022-09-10 RX ORDER — HYDROCODONE BITARTRATE AND ACETAMINOPHEN 5; 325 MG/1; MG/1
1 TABLET ORAL EVERY 6 HOURS PRN
Qty: 12 TABLET | Refills: 0 | Status: SHIPPED | OUTPATIENT
Start: 2022-09-10 | End: 2022-09-16

## 2022-09-10 RX ORDER — DIAPER,BRIEF,INFANT-TODD,DISP
1 EACH MISCELLANEOUS EVERY 12 HOURS SCHEDULED
Qty: 28 G | Refills: 0 | Status: SHIPPED | OUTPATIENT
Start: 2022-09-10 | End: 2022-09-16

## 2022-09-10 RX ORDER — OXYBUTYNIN CHLORIDE 10 MG/1
10 TABLET, EXTENDED RELEASE ORAL DAILY
Qty: 15 TABLET | Refills: 0 | Status: SHIPPED | OUTPATIENT
Start: 2022-09-11 | End: 2022-10-06

## 2022-09-10 RX ORDER — NITROFURANTOIN 25; 75 MG/1; MG/1
100 CAPSULE ORAL 2 TIMES DAILY
Qty: 6 CAPSULE | Refills: 0 | Status: SHIPPED | OUTPATIENT
Start: 2022-09-15 | End: 2022-09-18

## 2022-09-10 RX ADMIN — OXYCODONE 5 MG: 5 TABLET ORAL at 03:49

## 2022-09-10 RX ADMIN — ENOXAPARIN SODIUM 40 MG: 40 INJECTION SUBCUTANEOUS at 08:42

## 2022-09-10 RX ADMIN — GABAPENTIN 100 MG: 100 CAPSULE ORAL at 08:42

## 2022-09-10 RX ADMIN — ACETAMINOPHEN 650 MG: 325 TABLET, FILM COATED ORAL at 08:42

## 2022-09-10 RX ADMIN — BACITRACIN 1 APPLICATION: 500 OINTMENT TOPICAL at 08:46

## 2022-09-10 RX ADMIN — ACETAMINOPHEN 650 MG: 325 TABLET, FILM COATED ORAL at 13:05

## 2022-09-10 RX ADMIN — OXYBUTYNIN CHLORIDE 10 MG: 10 TABLET, EXTENDED RELEASE ORAL at 08:42

## 2022-09-10 RX ADMIN — BISACODYL 10 MG: 10 SUPPOSITORY RECTAL at 11:53

## 2022-09-10 RX ADMIN — SENNOSIDES AND DOCUSATE SODIUM 2 TABLET: 50; 8.6 TABLET ORAL at 08:42

## 2022-09-10 RX ADMIN — CEFAZOLIN SODIUM 2 G: 2 INJECTION, SOLUTION INTRAVENOUS at 00:03

## 2022-09-10 RX ADMIN — CEFAZOLIN SODIUM 2 G: 2 INJECTION, SOLUTION INTRAVENOUS at 08:46

## 2022-09-10 NOTE — OUTREACH NOTE
Prep Survey    Flowsheet Row Responses   [facility] discharged [facility]? [facility]   Is LACE score < 7 ? No   Emergency Room discharge w/ pulse ox? No   Eligibility [facility]   Date of Admission 09/09/22   Date of Discharge 09/10/22   Discharge Disposition Home or Self Care   Discharge diagnosis BPH prostatectomy lap this visit   Does the patient have one of the following disease processes/diagnoses(primary or secondary)? General Surgery   Does the patient have Home health ordered? No   Is there a DME ordered? No   Prep survey completed? Yes          HAYDEE MIRELES - Registered Nurse

## 2022-09-10 NOTE — PLAN OF CARE
Goal Outcome Evaluation:  Plan of Care Reviewed With: patient           Outcome Evaluation: Pt is independent with mobility. Skilled PT services are not indicated at this time. This was discussed and agreed upon w/nsg. Discharge PT.

## 2022-09-10 NOTE — THERAPY DISCHARGE NOTE
Patient Name: Wade Mcmanus  : 1945    MRN: [id number]                              Today's Date: 9/10/2022       Admit Date: 2022    Visit Dx:     ICD-10-CM ICD-9-CM   1. BPH with obstruction/lower urinary tract symptoms  N40.1 600.01    N13.8 599.69     Patient Active Problem List   Diagnosis   • Generalized anxiety disorder   • Mixed hyperlipidemia   • Essential hypertension   • Dry skin   • BPH with obstruction/lower urinary tract symptoms   • Cough   • Chronic bronchitis, simple (HCC)   • Actinic keratosis   • Gross hematuria   • Weight loss, unintentional   • Primary osteoarthritis of both hips   • Primary osteoarthritis of right knee   • Primary osteoarthritis involving multiple joints   • Prostate cancer (HCC)     Past Medical History:   Diagnosis Date   • Anxiety    • Enlarged prostate    • History of screening colonoscopy 10/22/2018    Normal colon to cecum. Grade 2 internal hemorrhoids. Repeat recommended at 5 year interval if remains healthy.    • Primary osteoarthritis involving multiple joints 2022   • Primary osteoarthritis of both hips 2022   • Primary osteoarthritis of right knee 2022   • Screening for AAA (abdominal aortic aneurysm) 2019    Patent nonaneurysmal abdominal aorta with mild ectasia of the proximal aortic segment.    • Sleep apnea     IS NOT CPAP/BIPAP COMPLIANT     Past Surgical History:   Procedure Laterality Date   • COLONOSCOPY     • HERNIA REPAIR      INGUINAL, PATIENT UNSURE AS TO WHICH SIDE   • KNEE SURGERY Right     knee arthroscopy [facility]   • TONSILLECTOMY        General Information     Row Name 09/10/22 0725          Physical Therapy Time and Intention    Document Type evaluation;discharge evaluation/summary  -LS     Mode of Treatment physical therapy  -LS     Row Name 09/10/22 0725          General Information    Patient Profile Reviewed yes  -LS     Prior Level of Function independent:;community mobility;all household mobility   indep without a.d.  -LS     Row Name 09/10/22 0725          Living Environment    People in Home child(angelica), adult  adult dtr lives w/pt; pt said his dtr has a disability  -LS     Row Name 09/10/22 0725          Home Main Entrance    Number of Stairs, Main Entrance two  -LS     Stair Railings, Main Entrance railing on left side (ascending)  -LS     Row Name 09/10/22 0725          Stairs Within Home, Primary    Stairs Comment, Within Home, Primary pt's bedroom is on main level of condo. he has an office in the basement; those stairs have a railing.  -LS     Row Name 09/10/22 0725          Cognition    Orientation Status (Cognition) oriented to;person;situation;time  Pt knew was in the hospital but incorrectly stated which one.  -LS           User Key  (r) = Recorded By, (t) = Taken By, (c) = Cosigned By    Initials Name Provider Type    Chery Villanueva, CARMINE Physical Therapist               Mobility     Row Name 09/10/22 0725          Bed Mobility    Bed Mobility supine-sit;sit-supine  -LS     Supine-Sit [place] (Bed Mobility) independent  -LS     Sit-Supine [place] (Bed Mobility) independent  -LS     Comment, (Bed Mobility) PT educated patient on log rolling in and OOB; pt demonstrated understanding.  -LS     Row Name 09/10/22 0725          Sit-Stand Transfer    Sit-Stand [place] (Transfers) independent  -LS     Row Name 09/10/22 0725          Gait/Stairs (Locomotion)    [place] Level (Gait) independent  -LS     Distance in Feet (Gait) 250  -LS     Deviations/Abnormal Patterns (Gait) gait speed decreased;bilateral deviations;stride length decreased  -LS           User Key  (r) = Recorded By, (t) = Taken By, (c) = Cosigned By    Initials Name Provider Type    Chery Villanueva, PT Physical Therapist               Obj/Interventions     Row Name 09/10/22 0725          Range of Motion Comprehensive    General Range of Motion bilateral lower extremity ROM WFL  -LS     Row Name 09/10/22  0725          Strength Comprehensive (MMT)    Comment, General Manual Muscle Testing (MMT) Assessment B hip flexors 4-/5. R knee extensors and ankle dorsiflexors 4+. L knee extensors and ankle dorsiflexors 5/5.  -LS           User Key  (r) = Recorded By, (t) = Taken By, (c) = Cosigned By    Initials Name Provider Type    LS Chery Ordoñez, PT Physical Therapist               Goals/Plan    No documentation.                Clinical Impression     Row Name 09/10/22 0725          Pain    Pretreatment Pain Rating 7/10  -LS     Posttreatment Pain Rating 7/10  -LS     Pain Location - abdomen  -LS     Pain Intervention(s) Repositioned;Ambulation/increased activity  -     Row Name 09/10/22 0725          Plan of Care Review    Outcome Evaluation Pt is independent with mobility. Skilled PT services are not indicated at this time. This was discussed and agreed upon w/nsg. Discharge PT.  -     Row Name 09/10/22 0725          Therapy Assessment/Plan (PT)    Patient/Family Therapy Goals Statement (PT) Pt did not state.  -     Criteria for Skilled Interventions Met (PT) no;no problems identified which require skilled intervention  -     Therapy Frequency (PT) evaluation only  -     Row Name 09/10/22 0725          Vital Signs    Pre Systolic BP Rehab 145  -LS     Pre Treatment Diastolic BP 81  -LS     Pretreatment Heart Rate (beats/min) 62  -LS     Posttreatment Heart Rate (beats/min) 63  -LS     Pre SpO2 (%) 99  -LS     O2 Delivery Pre Treatment room air  -LS     Post SpO2 (%) 93  -LS     O2 Delivery Post Treatment room air  -LS     Pre Patient Position Supine  -LS     Post Patient Position Sitting  -     Row Name 09/10/22 0725          Positioning and Restraints    Pre-Treatment Position in bed  -LS     Post Treatment Position chair  -LS     In Chair notified nsg;sitting;call light within reach;encouraged to call for assist;exit alarm on  -LS           User Key  (r) = Recorded By, (t) = Taken By, (c) = Cosigned  By    Initials Name Provider Type    Chery Villanueva, PT Physical Therapist               Outcome Measures     Row Name 09/10/22 0725 09/09/22 2200       How much help from another person do you currently need...    Turning from your back to your side while in flat bed without using bedrails? 4  -LS 4  -RH    Moving from lying on back to sitting on the side of a flat bed without bedrails? 4  -LS 3  -RH    Moving to and from a bed to a chair (including a wheelchair)? 4  -LS 3  -RH    Standing up from a chair using your arms (e.g., wheelchair, bedside chair)? 4  -LS 3  -RH    Climbing 3-5 steps with a railing? 4  -LS 2  -RH    To walk in hospital room? 4  -LS 3  -RH    AM-PAC 6 Clicks Score (PT) 24  -LS 18  -RH    Highest level of mobility 8 --> Walked 250 feet or more  -LS 6 --> Walked 10 steps or more  -RH    Row Name 09/10/22 0725          Functional Assessment    Outcome Measure Options AM-PAC 6 Clicks Basic Mobility (PT)  -           User Key  (r) = Recorded By, (t) = Taken By, (c) = Cosigned By    Initials Name Provider Type    Chery Villanueva, PT Physical Therapist     Mary Gambino, RN Registered Nurse              Physical Therapy Education                 Title: PT OT SLP Therapies (Resolved)     Topic: Physical Therapy (Resolved)     Point: Mobility training (Resolved)     Learning Progress Summary           Patient Acceptance, E, NELA,DU by  at 9/10/2022 0725    Comment: Pt needed vcs for log rolling technique in and OOB. Demonstrated supine to sit X 2 and sit to supine once.                               User Key     Initials Effective Dates Name Provider Type Discipline     06/16/21 -  Chery Ordoñez, PT Physical Therapist PT              PT Recommendation and Plan     Plan of Care Reviewed With: patient  Outcome Evaluation: Pt is independent with mobility. Skilled PT services are not indicated at this time. This was discussed and agreed upon w/nsg. Discharge PT.     Time  Calculation:    PT Charges     Row Name 09/10/22 0725             Time Calculation    Start Time 0725  -LS      PT Received On 09/10/22  -LS              Time Calculation- PT    Total Timed Code Minutes- PT 10 minute(s)  -LS              Timed Charges    92949 - PT Therapeutic Activity Minutes 10  -LS              Untimed Charges    PT Eval/Re-eval Minutes 55  -LS              Total Minutes    Timed Charges Total Minutes 10  -LS      Untimed Charges Total Minutes 55  -LS       Total Minutes 65  -LS            User Key  (r) = Recorded By, (t) = Taken By, (c) = Cosigned By    Initials Name Provider Type    Chery Villanueva, PT Physical Therapist              Therapy Charges for Today     Code Description Service Date Service Provider Modifiers Qty    03332594097 HC PT EVAL LOW COMPLEXITY 4 9/10/2022 Chery Ordoñez, PT GP 1    88538296856 HC PT THERAPEUTIC ACT EA 15 MIN 9/10/2022 Chery Ordoñez, PT GP 1          PT G-Codes  Outcome Measure Options: AM-PAC 6 Clicks Basic Mobility (PT)  AM-PAC 6 Clicks Score (PT): 24    PT Discharge Summary  Anticipated Discharge Disposition (PT): home    Chery Ordoñez, PT  9/10/2022

## 2022-09-10 NOTE — DISCHARGE INSTRUCTIONS
Robotic Assisted Simple Prostatectomy - Post Operative Care    Pathology Results  Your pathology results will be discussed at your postoperative follow-up, your surgeon also may call you once these results are finalized, this usually takes 4 to 5 days or more.    Anesthesia Precautions and Expectations  Rest for 24 hours after receiving general anesthesia, make sure you have someone at home with you that can monitor you.  Do not operate a vehicle, drink alcohol, or make 'important decisions'/sign legal documentation during the immediate recovery period.  You may experience a sore throat, jaw discomfort, or muscle aches related to anesthesia, these symptoms may last a few days.    Pain Control  You will be prescribed a short course of narcotics (Hydrocodone mixed with Tylenol or Oxycodone) that you should use as needed for moderate to severe pain.  If you are not having moderate to severe pain we recommend using over the counter Tylenol to control your pain.  This will help to prevent constipation which is very common when taking narcotics.  Do not drive a vehicle if taking narcotic.    Bowel Management  To prevent constipation, you will be sent home with a stool softener and a stimulant laxative to help keep your bowels moving at least 1 week post-procedure.  The medications are typically Senokot and/or MiraLAX.  Take these daily or twice daily to stimulate bowel movements, especially if taking narcotic.  It is okay if you are having loose bowels 1 week post procedure.  You can gradually come off of these medications once your bowels returned to normal.  Do not strain when having a bowel movement, this can increase your risk of hernia formation at your incisions.    Activity Level  It is important that you continue to walk multiple times every day when you return home.  This will help to ease abdominal pain, keep your abdominal muscles moving and stretch which promotes healing, helps to improve your bowel function,  and will help to prevent blood clots from forming in the legs (DVT).  It is recommended that you do not lift anything over 15 pounds for 6 weeks to prevent hernia formation at the sites of your incisions.     Showering  It is okay to shower once you return home, it is ok to shower with your catheter in place.  Let soapy water wash over your incisions, but do not directly scrub your incisions as this can remove the skin glue overlying your incisions which is necessary for healing.  Do not submerge in a bath, hot tub or swimming pool for at least 3 weeks after surgery.    Some important information regarding your CATHETER CARE - PLEASE READ    Do not remove your urinary catheter. If you feel that your catheter is not working the right way, call your doctor or health care team. Do not let anyone remove your catheter except your doctor or a nurse in your doctor's office. They will make sure that the catheter is taken out the right way so your urethra and bladder are not damaged.    Keep your skin and catheter clean. Clean the skin around your catheter at least two times each day. Clean your skin and catheter after every bowel movement. Apply the ointment for catheter comfort at the catheter tip and head of penis.      Always wash your hands with soap and warm water before and after cleaning your catheter.    Always keep your urine collection bag below the level of your bladder.  Keeping the bag below your bladder will help keep your urine from flowing back into your bladder from urine collection bag to reduce risk of developing urinary infections.     Do not have sexual intercourse (sex) while you have a catheter    Make sure you drink enough healthy liquids. Most adults should drink at least eight glasses of water daily.       Do not tug or pull on your catheter tubing. This can cause you to bleed and hurt your urethra. Do not step on the tubing when you walk.     You may also have a little leakage around the erazo  catheter - this is likely from a bladder spasm, sometimes it can be bloody and men often say it happens when they sit down to have a bowel movement. Take your bladder spasm medication - Levsin and ditropan to help with spasms. If it is very bloody and does not stop, please let us know immediately.    You may notice some blood in your catheter tubing or around the catheter at the tip of your penis. This is normal, especially after a bowel movement or walking. If this happens drink plenty of fluids and take it easy for a couple of hours. If your urine does not clear then do not hesitate to call for further instructions.    It is very common to have swelling and discoloration of the scrotum and the penile skin after having your prostate tissue removed. This is simply fluid that has not been absorbed by the body. It is not harmful. If the scrotum is swollen, put a rolled hand towel underneath the scrotum to elevate it when lying down.    BLADDER SPASMS:  Sometimes, men have bladder spasms while the catheter is in their penis. Bladder spasms may feel like bad cramps or pains in the area above your pubic hair or in your rectum and can hurt. It may also feel like an intense urge to urinate or you may have a sudden leakage of urine around your catheter. These spasms may be caused by the catheter bothering your bladder. If you have a bladder spasm, sometimes urine will leak around your catheter. If this happens you may want to put a towel on your bed to protect your mattress. You have two medications that will be given to you to help with these spasms. When the catheter is taken out, the bladder spasms will stop. Stay away from caffeine, spicy foods, acidic food, or alcohol while the catheter is in place as this can exacerbate bladder spasms.     BLOOD IN YOUR URINE:  It is normal for the urine draining from your catheter to have some small blood clots. This is very common if you have had prostate cancer surgery. Your urine  can also become blood-tinged from time to time. When your urine is blood-tinged, it has a pinkish color or even light red.    CALL IF: You urine is the color or cabernet wine, if you see larger sized blood clots in the urine, you have not had any urine out of your catheter for a couple of hours or feel like your bladder is full. Some clots can block your catheter from draining. You should regularly check that urine is draining from the catheter into your urine collection bag. It is important for you to drink healthy liquids when you have a catheter. This will help flush out your bladder.    -----------------------------------------    Please contact your physician if you are experiencing the following symptoms after your procedure:    Fever >100 Farenheit, chills, nausea, vomiting, severe abdominal or chest pain, severe calf pain or leg swelling, severe urinary bleeding (darker than red fruit punch or red wine), urinary clots, or unable to urinate for more than 8 hours.     Please call the [facility] [place] mainline at [phone], the Urology office at [phone], or present to your nearest Emergency Department if you have ongoing concerns mentioned above.

## 2022-09-10 NOTE — DISCHARGE SUMMARY
Date of Discharge:  9/10/2022    Discharge Diagnosis:   BPH with Urinary Obstruction     Problem List:  Active Hospital Problems    Diagnosis  POA   • **BPH with obstruction/lower urinary tract symptoms [N40.1, N13.8]  Yes      Resolved Hospital Problems   No resolved problems to display.       Presenting Problem/History of Present Illness  BPH with obstruction/lower urinary tract symptoms [N40.1, N13.8]       Hospital Course  Patient is a 77 y.o. male presented with extreme BPH and chronic bladder findings indicative of chronic bladder outlet obstruction.  Patient's prostate gland was very enlarged and he underwent robotic simple prostatectomy 9-9-2022.  He tolerated the procedure well and was admitted to the medical surgical floor overnight for continuous bladder irrigation which was weaned off.  He ambulated in the hallway, on postoperative day 1 morning he had some mild abdominal distention which improved after passing flatus and small bowel movement with suppository.  He tolerated clear liquid diet and then advance to a regular diet without nausea or vomiting.  The patient received catheter teaching and was discharged home, he will return to clinic in 7 days for catheter removal and postoperative follow-up in my clinic.  Return precautions discussed including nausea, vomiting, severe abdominal distention, severe pain, fevers or chills etc.  KAITLYNN drain was removed prior to discharge.    Procedures Performed    Procedure(s):  PROSTATECTOMY LAPAROSCOPIC SIMPLE WITH DAVINCI ROBOT  -------------------       Consults:   Consults     No orders found for last 30 day(s).          Pertinent Test Results: labs: Hemoglobin stable, creatinine 0.5, urine output and KAITLYNN output appropriate    Condition on Discharge: Able    Vital Signs  Temp:  [96.9 °F (36.1 °C)-98.2 °F (36.8 °C)] 97.7 °F (36.5 °C)  Heart Rate:  [60-76] 68  Resp:  [16] 16  BP: (106-158)/(63-82) 121/64    Discharge Disposition  Home or Self Care    Discharge  Medications     Discharge Medications      New Medications      Instructions Start Date   bacitracin 500 UNIT/GM ointment   1 application, Topical, Every 12 Hours Scheduled, Apply to head of penis and catheter tip twice daily for comfort      HYDROcodone-acetaminophen 5-325 MG per tablet  Commonly known as: Norco   1 tablet, Oral, Every 6 Hours PRN      hyoscyamine sulfate 0.125 MG tablet dispersible disintegrating tablet  Commonly known as: ANASPAZ   0.125 mg, Translingual, Every 4 Hours PRN      nitrofurantoin (macrocrystal-monohydrate) 100 MG capsule  Commonly known as: Macrobid   100 mg, Oral, 2 Times Daily, Start taking day prior to urology follow up   Start Date: September 15, 2022     ondansetron 4 MG tablet  Commonly known as: ZOFRAN   4 mg, Oral, Every 6 Hours PRN      oxybutynin XL 10 MG 24 hr tablet  Commonly known as: DITROPAN-XL   10 mg, Oral, Daily, Take daily to prevent bladder discomfort/spasms   Start Date: September 11, 2022        Continue These Medications      Instructions Start Date   acetaminophen 325 MG tablet  Commonly known as: TYLENOL   650 mg, Oral, As Needed      aspirin 325 MG tablet   1 tablet, Oral, 3 Times Daily, Taking 1  tablet in AM and 1 tablet at noon right now             Discharge Diet   Diet Instructions     Diet: Regular      Discharge Diet: Regular          Activity at Discharge  Activity Instructions     Driving Restrictions      Type of Restriction: Driving    Driving Restrictions: No Driving While Taking Narcotics    Gradually Increase Activity Until at Pre-Hospitalization Level      Lifting Restrictions      Type of Restriction: Lifting    Lifting Restrictions: Lifting Restriction (Indicate Limit)    Weight Limit (Pounds): 15    Length of Lifting Restriction: 6 weeks          Follow-up Appointments  Future Appointments   Date Time Provider Department Center   11/15/2022  2:40 PM Jimmie Busby MD [facility]   6/5/2023  3:00 PM Cuate Solis MD [facility] IM NICRD  GLO     Additional Instructions for the Follow-ups that You Need to Schedule     Call MD With Problems / Concerns   As directed      Instructions: Please contact your physician if you are experiencing the following symptoms after your procedure:    Fever >100 Farenheit, chills, nausea, vomiting, severe abdominal or chest pain, severe urinary bleeding (darker than red fruit punch), urinary clots, or unable to urinate for more than 8 hours.     Please call the [facility] mainline at [phone], the Urology office at [phone], or present to your nearest Emergency Department if you have ongoing concerns mentioned above.    Order Comments: Instructions: Please contact your physician if you are experiencing the following symptoms after your procedure:  Fever >100 Farenheit, chills, nausea, vomiting, severe abdominal or chest pain, severe urinary bleeding (darker than red fruit punch), urinary clots, or unable to urinate for more than 8 hours.  Please call the [facility] mainline at [phone], the Urology office at [phone], or present to your nearest Emergency Department if you have ongoing concerns mentioned above.          Discharge Follow-up with Specified Provider: Jose Mercado MD; 1 Week   As directed      To: Jose Mercado MD    Follow Up: 1 Week    Follow Up Details: Friday 9/16 for postop and catheter removal               Test Results Pending at Discharge  Pending Labs     Order Current Status    Tissue Pathology Exam In process          Jose Mercado MD    Time: Discharge 25 min

## 2022-09-10 NOTE — PLAN OF CARE
Goal Outcome Evaluation:  Plan of Care Reviewed With: patient        Progress: no change   A/Ox4; VSS; currently on room air; and non-telemetry. Pt ambulating, using IS, and chewing gum per ERAS protocol. Gait has improved with each ambulation this shift. CBI has ran fast most of the shift in order to maintain pink-clear urine. Cath care completed. Pt had CHG on previous shift. C/O pain, PRN medication administered per orders and complementary therapies encouraged. Incisions CDI, and total KAITLYNN output was 50 mL this shift. Will continue to monitor.

## 2022-09-10 NOTE — PLAN OF CARE
Problem: Adult Inpatient Plan of Care  Goal: Plan of Care Review  9/10/2022 4516 by Patty Jose, RN  Outcome: Met  Flowsheets (Taken 9/10/2022 1453)  Outcome Evaluation: DC home with family. Catheter teaching done and video played   Goal Outcome Evaluation:              Outcome Evaluation: DC home with family. Catheter teaching done and video played

## 2022-09-10 NOTE — PROGRESS NOTES
[facility]   Urology Progress Note    Patient Name: Wade Mcmanus  : 1945  MRN: [id number]  Primary Care Physician:  Cuate Solis MD  Date of admission: 2022    Subjective   Subjective     Chief Complaint: BPH     HPI:  Patient Reports mild abdominal bloating overnight, he has not passed flatus.  He is tolerating clears.  He has ambulated.  He is up to chair today.  CBI draining clear overnight.  No manual irrigation or significant bleeding noted.  KAITLYNN drain with minimal output, 80 cc over 24 hours.  Urine output is appropriate.  Postoperative and morning labs are stable with hemoglobin 13, creatinine 0.6.  He denies vomiting or nausea.    Review of Systems   All systems were reviewed and negative except for: none    Objective   Objective     Vitals:   Temp:  [96.9 °F (36.1 °C)-98.2 °F (36.8 °C)] 98 °F (36.7 °C)  Heart Rate:  [60-82] 67  Resp:  [14-18] 16  BP: (106-177)/(63-94) 145/81  Flow (L/min):  [2] 2  FiO2 (%):  [96 %] 96 %  Physical Exam    Constitutional: Awake in bed, alert   Eyes: PERRLA, sclerae anicteric, no conjunctival injection   HENT: Normocephalic, atraumatic, mucous membranes moist   Neck: Supple, trachea midline   Respiratory: Equal chest rise, non-labored respirations    Cardiovascular: RRR, palpable radial pulses bilaterally   Gastrointestinal: Soft, robotic port site incisions well approximated, no significant erythema or ecchymosis, left lower quadrant KAITLYNN with minimal serosanguineous output, mild abdominal distention, tympanic   Genitourinary: circumcised phallus, orthotopic meatus, bilaterally descended testicles without masses, Christine catheter in place draining clear urine   Musculoskeletal: No lower extremity edema bilaterally, no clubbing or cyanosis to extremities   Psychiatric: Appropriate affect, cooperative   Neurologic: Oriented x 3,  Cranial Nerves grossly intact, speech clear   Skin: No rashes     Result Review    Result Review:  I have personally reviewed the  results from the time of this admission to 9/10/2022 10:26 EDT and agree with these findings:  [x]  Laboratory  []  Microbiology  []  Radiology  []  EKG/Telemetry   []  Cardiology/Vascular   []  Pathology  []  Old records  []  Other:    Most notable findings include: Creatinine 0.5, hemoglobin 12.6, no leukocytosis.  Afebrile, vital signs stable.  Urine output, KAITLYNN output appropriate.    Assessment & Plan   Assessment / Plan     Brief Patient Summary:  Wade Mcmanus is a 77 y.o. male who has extreme BPH, underwent robotic simple prostatectomy 9/9/2022.  Surgery uncomplicated.  Patient admitted to [facility] floor overnight with continuous bladder irrigation which has drained clear.  KAITLYNN output appropriate, his KAITLYNN drain was removed at the bedside today.  CBI clamped.  Mild tympanic distended abdomen, no flatus yet.  We will add suppositories, he will try regular lunch and if he tolerates this and passes flatus he can discharge later this afternoon versus p.m.  Otherwise he will stay the night and discharge tomorrow pending bowel activity.    Active Hospital Problems:  Active Hospital Problems    Diagnosis    • **BPH with obstruction/lower urinary tract symptoms        Plan:     - Pain control: As needed oxycodone, Tylenol, gabapentin  - Diet/FEN: Regular diet, saline lock IV fluids  - GI: bowel regimen, add suppository  - : Continue Christine catheter, stop CBI  - Heme/ID: Hemoglobin stable, DC Ancef  - PPx: Lovenox  - OOB/Amb  -Disposition: Home today if passes flatus with improvement in abdominal distention, pending toleration of lunch, otherwise will stay the night and discharge tomorrow pending bowel activity         DVT prophylaxis:  Medical DVT prophylaxis orders are present.    CODE STATUS:   Level Of Support Discussed With: Patient  Code Status (Patient has no pulse and is not breathing): CPR (Attempt to Resuscitate)  Medical Interventions (Patient has pulse or is breathing): Full Support  Release to patient: Routine  Release    Disposition:  I expect patient to possibly discharge this afternoon versus tomorrow pending bowel activity.    Electronically signed by Jose Mercado MD, 09/10/22, 10:22 AM EDT.

## 2022-09-10 NOTE — PLAN OF CARE
Problem: Adult Inpatient Plan of Care  Goal: Plan of Care Review  Outcome: Ongoing, Progressing  Flowsheets (Taken 9/10/2022 0941)  Outcome Evaluation: DC home with family. Catheter teaching done and video played   Goal Outcome Evaluation:              Outcome Evaluation: DC home with family. Catheter teaching done and video played

## 2022-09-12 ENCOUNTER — TRANSITIONAL CARE MANAGEMENT TELEPHONE ENCOUNTER (OUTPATIENT)
Dept: CALL CENTER | Facility: HOSPITAL | Age: 77
End: 2022-09-12

## 2022-09-12 NOTE — OUTREACH NOTE
Call Center TCM Note    Flowsheet Row Responses   [facility] patient discharged from? [place]   Does the patient have one of the following disease processes/diagnoses(primary or secondary)? General Surgery   TCM attempt successful? Yes   Call start time 1021   Call end time 1033   Discharge diagnosis BPH prostatectomy lap this visit   Is patient permission given to speak with other caregiver? Yes   List who call center can speak with Izabela Mcmanus Sister    Person spoke with today (if not patient) and relationship Izabela Mcmanus Sister    Meds reviewed with patient/caregiver? Yes   Does the patient have all medications related to this admission filled (includes all antibiotics, pain medications, etc.) Yes   Is the patient taking all medications as directed (includes completed medication regime)? Yes   Comments Patient to follow up with surgeon/urology one week.    Has home health visited the patient within 72 hours of discharge? N/A   Psychosocial issues? No   Comments Patient home with erazo cath   Did the patient receive a copy of their discharge instructions? Yes   Nursing interventions Reviewed instructions with patient  [sister]   What is the patient's perception of their health status since discharge? Improving   Nursing interventions Nurse provided patient education   Is the patient /caregiver able to teach back basic post-op care? No tub bath, swimming, or hot tub until instructed by MD  [Dressing change: 1 times per day using provided dressing until skin is scabbed/sealed]   Is the patient/caregiver able to teach back signs and symptoms of incisional infection? Increased redness, swelling or pain at the incisonal site, Fever   Is the patient/caregiver able to teach back steps to recovery at home? Set small, achievable goals for return to baseline health, Rest and rebuild strength, gradually increase activity   If the patient is a current smoker, are they able to teach back resources for cessation?  Not a smoker   Is the patient/caregiver able to teach back the hierarchy of who to call/visit for symptoms/problems? PCP, Specialist, Home health nurse, Urgent Care, ED, 911 Yes   TCM call completed? Yes          Ria Tejada RN    9/12/2022, 10:34 EDT

## 2022-09-15 NOTE — PROGRESS NOTES
"Enter Query Response Below      Query Response:   Yes    Jose Mercado MD             If applicable, please update the problem list.    Patient: Wade Mcmanus        : 1945  Account: [id number]           Admit Date: 2022        Options to Respond to Query:    1. Access the Encounter     a. From the To-Do Side bar, click Respond With Note.     b. Click New Note     c. Answer query within the yellow box.                d. Update the Problem List if applicable.     Dr. Mercado,     Based on Medicare billing guidelines [facility] are not allowed to use diagnoses from pathology reports as the sole basis for billing. Any findings noted on a pathology report must be affirmed by the treating physician before billing. The pathologist reported that the specimen shows \"Prostatic adenocarcinoma, Kristopher score 3+4=7.  Histologic Type: Acinar adenocarcinoma.\"      Is this finding consistent with your clinical impression and treatment plan for this patient?    Yes  No  Other explanation for clinical impression and treatment plan_________  Clinically indeterminable    By submitting this query, we are merely seeking further clarification of documentation to accurately reflect all conditions that you are monitoring, evaluating, treating or that extend the hospitalization or utilize additional resources of care. Please utilize your independent clinical judgment when addressing the question(s) above.     This query and your response, once completed, will be entered into the legal medical record.    Sincerely,  Cande VANN RN CCDS  [email or web address]  System Director Clinical Documentation Integrity Program     "

## 2022-09-16 ENCOUNTER — OFFICE VISIT (OUTPATIENT)
Dept: UROLOGY | Facility: CLINIC | Age: 77
End: 2022-09-16

## 2022-09-16 VITALS — WEIGHT: 135 LBS | BODY MASS INDEX: 19.33 KG/M2 | HEIGHT: 70 IN

## 2022-09-16 DIAGNOSIS — C61 PROSTATE CANCER: ICD-10-CM

## 2022-09-16 DIAGNOSIS — N40.1 BPH WITH OBSTRUCTION/LOWER URINARY TRACT SYMPTOMS: Primary | ICD-10-CM

## 2022-09-16 DIAGNOSIS — N13.8 BPH WITH OBSTRUCTION/LOWER URINARY TRACT SYMPTOMS: Primary | ICD-10-CM

## 2022-09-16 PROCEDURE — 99214 OFFICE O/P EST MOD 30 MIN: CPT | Performed by: STUDENT IN AN ORGANIZED HEALTH CARE EDUCATION/TRAINING PROGRAM

## 2022-09-16 NOTE — PROGRESS NOTES
Follow Up Office Visit      Patient Name: Wade Mcmanus  : 1945   MRN: [id number]     Chief Complaint:    Chief Complaint   Patient presents with   • catheter removal and trial of void       Referring Provider: No ref. provider found    History of Present Illness: Wade Mcmanus is a 77 y.o. male who presents today for follow up regarding BPH with urinary obstruction.  The patient has progressive BPH with lower urinary tract symptoms, his prostate was measured at roughly 140 cc by volume.  The patient underwent a robotic simple prostatectomy for extreme BPH on 2022.  His procedure was uncomplicated.  He was admitted to the medical surgical floor overnight and discharged postoperative day 1.  He maintained his Christine catheter for the last week and he returns today for catheter removal.    Incidentally, we discovered 5% Kristopher 3+4 equal 7 prostate cancer in his prostate specimen.  His preoperative PSA was normal as below.    Lab Results   Component Value Date    PSA 2.2 2022    PSA 1.750 2019    PSA 1.750 2018    PSA 1.500 2017    PSA 1.70 2015    PSA 2.2 2015     Discussed with the patient that given his age, it is not uncommon to find small amount of incidental prostate cancer as most men will develop some degree of prostate cancer at some point in their later years.    SPECIMEN:   Procedure: Simple prostatectomy  Prostate Size:   Prostate Weight (g): 59.7 g  5.7 cm x 4.4 cm x 4 cm     TUMOR:   Histologic Type: Acinar adenocarcinoma  Histologic Grade:   Grade Group and Walker Score:   - Grade group 2 (Kristopher Score 3 + 4 = 7)    Percentage of Pattern 4: 30 %  Intraductal Carcinoma (IDC) : Not identified  Tumor Quantitation: Estimated percentage of prostate involved by tumor 5 %  Extraprostatic Extension (EPE): Not identified  Urinary Bladder Neck Invasion : Not identified  Seminal Vesicle Invasion: No seminal vesicle present  Treatment Effect: No known presurgical  therapy  Lymphovascular Invasion: Not Identified  Perineural Invasion: Not identified     MARGINS :   Margins: Uninvolved by invasive carcinoma       Subjective      Review of System: Review of Systems   Constitutional: Negative for chills, fatigue, fever and unexpected weight change.   HENT: Negative for sore throat.    Eyes: Negative for visual disturbance.   Respiratory: Negative for cough, chest tightness and shortness of breath.    Cardiovascular: Negative for chest pain and leg swelling.   Gastrointestinal: Negative for blood in stool, constipation, diarrhea, nausea, rectal pain and vomiting.   Genitourinary: Positive for difficulty urinating. Negative for decreased urine volume, enuresis, flank pain, frequency, genital sores, hematuria and urgency.   Musculoskeletal: Negative for back pain and joint swelling.   Skin: Negative for rash and wound.   Neurological: Negative for seizures, speech difficulty, weakness and headaches.   Psychiatric/Behavioral: Negative for confusion, sleep disturbance and suicidal ideas. The patient is not nervous/anxious.       I have reviewed the ROS documented by my clinical staff, I have updated appropriately and I agree. Jose Mercado MD    I have reviewed and the following portions of the patient's history were updated as appropriate: past family history, past medical history, past social history, past surgical history and problem list.    Medications:     Current Outpatient Medications:   •  acetaminophen (TYLENOL) 325 MG tablet, Take 650 mg by mouth As Needed for Mild Pain ., Disp: , Rfl:   •  aspirin 325 MG tablet, Take 1 tablet by mouth 3 (Three) Times a Day. Taking 1  tablet in AM and 1 tablet at noon right now, Disp: , Rfl:   •  bacitracin 500 UNIT/GM ointment, Apply 1 application topically to the appropriate area as directed Every 12 (Twelve) Hours. Apply to head of penis and catheter tip twice daily for comfort, Disp: 28 g, Rfl: 0  •  HYDROcodone-acetaminophen  "(Norco) 5-325 MG per tablet, Take 1 tablet by mouth Every 6 (Six) Hours As Needed for Severe Pain., Disp: 12 tablet, Rfl: 0  •  hyoscyamine sulfate (ANASPAZ) 0.125 MG tablet dispersible disintegrating tablet, Place 1 tablet on the tongue Every 4 (Four) Hours As Needed (Bladder spasms/urgency)., Disp: 30 tablet, Rfl: 0  •  nitrofurantoin, macrocrystal-monohydrate, (Macrobid) 100 MG capsule, Take 1 capsule by mouth 2 (Two) Times a Day for 3 days. Start taking day prior to urology follow up, Disp: 6 capsule, Rfl: 0  •  ondansetron (ZOFRAN) 4 MG tablet, Take 1 tablet by mouth Every 6 (Six) Hours As Needed for Nausea or Vomiting., Disp: 15 tablet, Rfl: 0  •  oxybutynin XL (DITROPAN-XL) 10 MG 24 hr tablet, Take 1 tablet by mouth Daily. Take daily to prevent bladder discomfort/spasms, Disp: 15 tablet, Rfl: 0    Allergies:   Allergies   Allergen Reactions   • Amoxicillin Rash   • Clindamycin Rash   • Penicillins Rash   • Sulfa Antibiotics Rash         Objective     Physical Exam:   Vital Signs:   Vitals:    09/16/22 1009   Weight: 61.2 kg (135 lb)   Height: 177.8 cm (70\")     Body mass index is 19.37 kg/m².     Physical Exam  Constitutional:       Appearance: Normal appearance.   HENT:      Head: Normocephalic and atraumatic.      Nose: Nose normal.   Eyes:      Extraocular Movements: Extraocular movements intact.      Conjunctiva/sclera: Conjunctivae normal.      Pupils: Pupils are equal, round, and reactive to light.   Abdominal:      Comments: Robotic port site incisions well approximated with skin glue, mild ecchymosis about the supraumbilical extraction site incision.  No hernias palpable.  Abdomen is nondistended, nontender.   Genitourinary:     Comments: Circumcised phallus, orthotopic meatus, bilaterally descended testicles without masses or lesions.  Christine catheter in place draining juvencio urine.  Musculoskeletal:         General: Normal range of motion.      Cervical back: Normal range of motion and neck supple. "   Skin:     General: Skin is warm and dry.      Findings: No lesion or rash.   Neurological:      General: No focal deficit present.      Mental Status: He is alert and oriented to person, place, and time. Mental status is at baseline.   Psychiatric:         Mood and Affect: Mood normal.         Behavior: Behavior normal.         Labs:   Brief Urine Lab Results  (Last result in the past 365 days)      Color   Clarity   Blood   Leuk Est   Nitrite   Protein   CREAT   Urine HCG        07/28/22 1343 Yellow   Clear   Negative   Negative   Negative   Negative                      Lab Results   Component Value Date    GLUCOSE 99 09/10/2022    CALCIUM 8.8 09/10/2022     09/10/2022    K 4.2 09/10/2022    CO2 27.0 09/10/2022     09/10/2022    BUN 10 09/10/2022    CREATININE 0.54 (L) 09/10/2022    EGFRIFAFRI 133 09/26/2019    EGFRIFNONA 110 09/26/2019    BCR 18.5 09/10/2022    ANIONGAP 7.0 09/10/2022       Lab Results   Component Value Date    WBC 9.65 09/10/2022    HGB 12.6 (L) 09/10/2022    HCT 37.2 (L) 09/10/2022    MCV 98.2 (H) 09/10/2022     09/10/2022       Images:   CT Abdomen Pelvis With & Without Contrast    Result Date: 7/20/2022   1.  Bilateral nonobstructing renal stones.  No evidence of ureteral stone or hydronephrosis. 2.  Diffusely enlarged prostate gland with trabeculated appearance of the urinary bladder which can be seen with chronic bladder outlet obstruction.  This report was finalized on 7/20/2022 8:32 AM by Arik Ledesma MD.        Measures:   Tobacco:   Wade Mcmanus  reports that he quit smoking about 49 years ago. His smoking use included cigarettes. He started smoking about 64 years ago. He has a 15.00 pack-year smoking history. He has never used smokeless tobacco..    Assessment / Plan      Assessment/Plan:   77 y.o. male who presented today for follow up of extreme BPH.  Status post robotic simple prostatectomy 9/9/2022.  Patient returns today for postop catheter removal.   Catheter was removed and patient was able to urinate successfully.  His urine is light pink-tinged.  Discussed he will likely experience hematuria over the next few weeks and he will need to urinate consistently every 2-3 hours and drink plenty of fluid to flush this out.  He will complete Macrobid course that he started yesterday for UTI prevention after catheter removal.  Return precautions discussed.    Patient has a small amount of incidentally discovered favorable intermediate risk prostate cancer.  I had a long discussion today with the patient regarding NCCN and AUA guidelines for the management of prostate cancer.  He is includes further work-up with repeat biopsy, observation and monitoring, or MRI prostate.  Patient is 77 years old and I discussed with the patient at this time I would recommend continued conservative monitoring given his PSA was normal as well.  We will check a PSA in 6 weeks.  If his PSA trend is stable I would recommend doing nothing else except for conservative monitoring.  Other options if PSA continues to rise or if patient becomes concerned psychologically about the diagnosis of prostate cancer we can offer him MRI prostate for further evaluation of the remaining peripheral zone to ensure he does not need any further biopsy or consideration of radiation treatment if residual cancer is identified.  I think this risk is very low and given his age I would recommend conservative monitoring.    Diagnoses and all orders for this visit:    1. BPH with obstruction/lower urinary tract symptoms (Primary)  -Timed and consistent emptying, increase water intake  -Monitor for stress urinary incontinence which is expected, patient provided diapers today  -Kegel exercises discussed, follow back up in 8 weeks for urinary symptom evaluation and postvoid residual bladder scan    2. Prostate cancer (HCC)  -Very small amount of incidental discovery 3+4 equal 7 grade group 2 prostate cancer on simple  prostatectomy specimen, at this time given the small amount of tissue and normal preoperative PSA I recommend conservative monitoring with PSA checks q6 months for now    -     PSA Diagnostic; Future        Follow Up:   Return in about 8 weeks (around 11/11/2022).    I spent approximately 20 minutes providing clinical care for this patient; including review of patient's chart and provider documentation, face to face time spent with patient in examination room (obtaining history, performing physical exam, discussing diagnosis and management options), placing orders, and completing patient documentation.     Jose Mercado MD  [facility] [place]

## 2022-09-19 ENCOUNTER — READMISSION MANAGEMENT (OUTPATIENT)
Dept: CALL CENTER | Facility: HOSPITAL | Age: 77
End: 2022-09-19

## 2022-09-19 PROBLEM — C61 PROSTATE CANCER (HCC): Status: ACTIVE | Noted: 2022-09-19

## 2022-09-19 NOTE — OUTREACH NOTE
General Surgery Week 2 Survey    Flowsheet Row Responses   [facility] facility patient discharged from? [place]   Does the patient have one of the following disease processes/diagnoses(primary or secondary)? General Surgery   Week 2 attempt successful? No   Unsuccessful attempts Attempt 1          FRANCOIS VILLANUEVA - Registered Nurse

## 2022-09-22 ENCOUNTER — READMISSION MANAGEMENT (OUTPATIENT)
Dept: CALL CENTER | Facility: HOSPITAL | Age: 77
End: 2022-09-22

## 2022-09-22 ENCOUNTER — TELEPHONE (OUTPATIENT)
Dept: UROLOGY | Facility: CLINIC | Age: 77
End: 2022-09-22

## 2022-09-22 NOTE — OUTREACH NOTE
General Surgery Week 2 Survey    Flowsheet Row Responses   [facility] patient discharged from? [place]   Does the patient have one of the following disease processes/diagnoses(primary or secondary)? General Surgery   Week 2 attempt successful? No   Unsuccessful attempts Attempt 2          TETE DELCID - Registered Nurse

## 2022-09-22 NOTE — TELEPHONE ENCOUNTER
Caller: ALFRED    Relationship to patient: SELF    Best call back number: [phone]    Patient is needing: PT CALLED TO CANCEL SAME DAY APPT , HE WILL CALL BACK NEXT WEEK TO R/S.

## 2022-09-23 ENCOUNTER — TELEPHONE (OUTPATIENT)
Dept: UROLOGY | Facility: CLINIC | Age: 77
End: 2022-09-23

## 2022-09-23 NOTE — TELEPHONE ENCOUNTER
----- Message from Jose Mercado MD sent at 9/22/2022  3:24 PM EDT -----  Regarding: reschedule appt  Patient canceled his appt today. But can we see if we can get him in anytime next week with me thanks    Jose Mercado MD

## 2022-09-29 ENCOUNTER — TELEPHONE (OUTPATIENT)
Dept: UROLOGY | Facility: CLINIC | Age: 77
End: 2022-09-29

## 2022-09-29 NOTE — TELEPHONE ENCOUNTER
Patient is wanting you to give him a call regarding some issues he is having. He did have an appt with you today, but canceled. He is rescheduled for next Thursday 10/6. He is asking to speak with you prior to that appt

## 2022-10-06 ENCOUNTER — OFFICE VISIT (OUTPATIENT)
Dept: UROLOGY | Facility: CLINIC | Age: 77
End: 2022-10-06

## 2022-10-06 VITALS — HEIGHT: 70 IN | BODY MASS INDEX: 19.37 KG/M2

## 2022-10-06 DIAGNOSIS — N99.111 POSTPROCEDURAL BULBOUS URETHRAL STRICTURE: Primary | ICD-10-CM

## 2022-10-06 DIAGNOSIS — N13.8 BPH WITH OBSTRUCTION/LOWER URINARY TRACT SYMPTOMS: ICD-10-CM

## 2022-10-06 DIAGNOSIS — N40.1 BPH WITH OBSTRUCTION/LOWER URINARY TRACT SYMPTOMS: ICD-10-CM

## 2022-10-06 DIAGNOSIS — R82.81 PYURIA: ICD-10-CM

## 2022-10-06 LAB
BILIRUB BLD-MCNC: NEGATIVE MG/DL
CLARITY, POC: CLEAR
COLOR UR: ABNORMAL
EXPIRATION DATE: ABNORMAL
GLUCOSE UR STRIP-MCNC: NEGATIVE MG/DL
KETONES UR QL: NEGATIVE
LEUKOCYTE EST, POC: ABNORMAL
Lab: ABNORMAL
NITRITE UR-MCNC: NEGATIVE MG/ML
PH UR: 6 [PH] (ref 5–8)
PROT UR STRIP-MCNC: ABNORMAL MG/DL
RBC # UR STRIP: ABNORMAL /UL
SP GR UR: 1.02 (ref 1–1.03)
UROBILINOGEN UR QL: NORMAL

## 2022-10-06 PROCEDURE — 51700 IRRIGATION OF BLADDER: CPT | Performed by: STUDENT IN AN ORGANIZED HEALTH CARE EDUCATION/TRAINING PROGRAM

## 2022-10-06 PROCEDURE — 87086 URINE CULTURE/COLONY COUNT: CPT | Performed by: STUDENT IN AN ORGANIZED HEALTH CARE EDUCATION/TRAINING PROGRAM

## 2022-10-06 PROCEDURE — 52000 CYSTOURETHROSCOPY: CPT | Performed by: STUDENT IN AN ORGANIZED HEALTH CARE EDUCATION/TRAINING PROGRAM

## 2022-10-06 PROCEDURE — 51798 US URINE CAPACITY MEASURE: CPT | Performed by: STUDENT IN AN ORGANIZED HEALTH CARE EDUCATION/TRAINING PROGRAM

## 2022-10-06 PROCEDURE — 81003 URINALYSIS AUTO W/O SCOPE: CPT | Performed by: STUDENT IN AN ORGANIZED HEALTH CARE EDUCATION/TRAINING PROGRAM

## 2022-10-06 PROCEDURE — 99214 OFFICE O/P EST MOD 30 MIN: CPT | Performed by: STUDENT IN AN ORGANIZED HEALTH CARE EDUCATION/TRAINING PROGRAM

## 2022-10-06 RX ORDER — NITROFURANTOIN 25; 75 MG/1; MG/1
100 CAPSULE ORAL 2 TIMES DAILY
Qty: 10 CAPSULE | Refills: 0 | Status: SHIPPED | OUTPATIENT
Start: 2022-10-06 | End: 2022-11-01

## 2022-10-06 NOTE — PROGRESS NOTES
Follow Up Office Visit      Patient Name: Wade Mcmanus  : 1945   MRN: [id number]     Chief Complaint:    Chief Complaint   Patient presents with   • Benign Prostatic Hypertrophy     With LUTS     • Prostate Cancer       Referring Provider: No ref. provider found    History of Present Illness: Wade Mcmanus is a 77 y.o. male who presents today for follow up regarding BPH with urinary obstruction.     The patient has a history of extreme BPH.  He originally saw me in 2022.  He was struggling with severe urinary symptoms, IPSS 28.  Evidence of incomplete bladder emptying at that time with  mL.  Normal PSA trend.  He underwent flexible cystoscopy 2022, this demonstrates massive trilobar hyperplasia, prostate size on previous  cc.  The patient elected to proceed to the operating room for robotic simple prostatectomy.  Patient was taken to the operating room 2022 for robotic simple prostatectomy.  Procedure was uncomplicated.  He was discharged with Christine catheter and returned to my clinic 2022 for catheter removal.  Surgical pathology demonstrated small amount of Kristopher 3+4 equal 7 prostate cancer incidentally identified in his prostate specimen.    Since his last evaluation, the patient complains of severe urinary symptoms which have not improved since presurgery.  He complains of weak stream, urgency, frequency nocturia and dribbling urinary stream.  His PVR today is 100 mL.    Patient's urine is positive for leukocytes and blood today.  He denies dysuria or hematuria.    Given the severity of his symptoms postoperatively I recommend proceeding to the procedure clinic for flexible cystoscopy to assess for the possibility of a residual prostatic obstruction or anatomical abnormality/stricture as result of surgery which may be impacting his bladder function.    If his bladder and prostatic fossa appear normal and widely patent, there is a chance the patient has end-stage  bladder with minimal remaining function despite procedure.    Subjective      Review of System: Review of Systems   Genitourinary: Positive for decreased urine volume, difficulty urinating, frequency and urgency.      I have reviewed the ROS documented by my clinical staff, I have updated appropriately and I agree. Jose Mercado MD    I have reviewed and the following portions of the patient's history were updated as appropriate: past family history, past medical history, past social history, past surgical history and problem list.    Medications:     Current Outpatient Medications:   •  acetaminophen (TYLENOL) 325 MG tablet, Take 650 mg by mouth As Needed for Mild Pain ., Disp: , Rfl:   •  aspirin 325 MG tablet, Take 1 tablet by mouth 3 (Three) Times a Day. Taking 1  tablet in AM and 1 tablet at noon right now, Disp: , Rfl:   •  nitrofurantoin, macrocrystal-monohydrate, (Macrobid) 100 MG capsule, Take 1 capsule by mouth 2 (Two) Times a Day., Disp: 10 capsule, Rfl: 0    Allergies:   Allergies   Allergen Reactions   • Amoxicillin Rash   • Clindamycin Rash   • Penicillins Rash   • Sulfa Antibiotics Rash       IPSS Questionnaire (AUA-7):  Over the past month…    1)  Incomplete Emptying:       How often have you had a sensation of not emptying you had the sensation of not emptying your bladder completely after you finished urinating?  5 - Almost always   2)  Frequency:       How often have you had the urinate again less than two hours after you finished urinating?  5 - Almost always   3)  Intermittency:       How often have you found you stopped and started again several times when you urinated?   5 - Almost always   4) Urgency:      How often have you found it difficult to postpone urination?  5 - Almost always   5) Weak Stream:      How often have you had a weak urinary stream?  5 - Almost always   6) Straining:       How often have you had to push or strain to begin urination?  0 - Not at all   7) Nocturia:      " How many times did you most typically get up to urinate from the time you went to bed at night until the time you got up in the morning?  5 - 5+ times   Total Score:  30   The International Prostate Symptom Score (IPSS) is used to screen, diagnose, track symptoms of benign prostatic hyperplasia (BPH).   0-7 (Mild Symptoms) 8-19 (Moderate) 20-35 (Severe)   Quality of Life (QoL):  If you were to spend the rest of your life with your urinary condition just the way it is now, how would you feel about that? 6-Terrible   Urine Leakage (Incontinence) 0-No Leakage     Sexual Health Inventory for Men (MADDY)   Over the past 6 months:     1. How do you rate your confidence that you could get and keep an erection?  0 - No sexual activity    2. When you had erections with sexual  stimulation, how often were your erections hard enough for penetration (entering your partner)?  0 - No Sexual Activity    3. During sexual intercourse, how often were you able to maintain your erection after you had penetrated (entered) your partner?  0 - Did not attempt intercourse   4. During sexual intercourse, how difficult was it to maintain your erection to completion of intercourse?  0 - Did not attempt intercourse   5. When you attempted sexual intercourse, how often was it satisfactory for you?  0 - Did not attempt intercourse    Total Score: 0   The Sexual Health Inventory for Men further classifies ED severity with the following breakpoints:   1-7 (Severe ED) 8-11 (Moderate ED) 12-16 (Mild to Moderate ED) 17-21 (Mild ED)      Post void residual bladder scan:   105 mL    Objective     Physical Exam:   Vital Signs:   Vitals:    10/06/22 1402   Height: 177.8 cm (70\")     Body mass index is 19.37 kg/m².     Physical Exam  Constitutional:       Appearance: Normal appearance.   HENT:      Head: Normocephalic and atraumatic.      Nose: Nose normal.   Eyes:      Extraocular Movements: Extraocular movements intact.      Conjunctiva/sclera: " Conjunctivae normal.      Pupils: Pupils are equal, round, and reactive to light.   Musculoskeletal:         General: Normal range of motion.      Cervical back: Normal range of motion and neck supple.   Skin:     General: Skin is warm and dry.      Findings: No lesion or rash.   Neurological:      General: No focal deficit present.      Mental Status: He is alert and oriented to person, place, and time. Mental status is at baseline.   Psychiatric:         Mood and Affect: Mood normal.         Behavior: Behavior normal.         Labs:   Brief Urine Lab Results  (Last result in the past 365 days)      Color   Clarity   Blood   Leuk Est   Nitrite   Protein   CREAT   Urine HCG        10/06/22 1427 Daisy   Clear   3+   Large (3+)   Negative   1+                      Lab Results   Component Value Date    GLUCOSE 99 09/10/2022    CALCIUM 8.8 09/10/2022     09/10/2022    K 4.2 09/10/2022    CO2 27.0 09/10/2022     09/10/2022    BUN 10 09/10/2022    CREATININE 0.54 (L) 09/10/2022    EGFRIFAFRI 133 09/26/2019    EGFRIFNONA 110 09/26/2019    BCR 18.5 09/10/2022    ANIONGAP 7.0 09/10/2022       Lab Results   Component Value Date    WBC 9.65 09/10/2022    HGB 12.6 (L) 09/10/2022    HCT 37.2 (L) 09/10/2022    MCV 98.2 (H) 09/10/2022     09/10/2022       Images:   CT Abdomen Pelvis With & Without Contrast    Result Date: 7/20/2022   1.  Bilateral nonobstructing renal stones.  No evidence of ureteral stone or hydronephrosis. 2.  Diffusely enlarged prostate gland with trabeculated appearance of the urinary bladder which can be seen with chronic bladder outlet obstruction.  This report was finalized on 7/20/2022 8:32 AM by Arik Ledesma MD.        Measures:   Tobacco:   Wade Mcmanus  reports that he quit smoking about 49 years ago. His smoking use included cigarettes. He started smoking about 64 years ago. He has a 15.00 pack-year smoking history. He has never used smokeless tobacco.         Preprocedure  diagnosis  Progressive lower urinary tract symptoms  Extreme BPH status post robotic simple prostatectomy 9/9/22    Postprocedure diagnosis  Progressive lower urinary tract symptoms  Extreme BPH status post robotic simple prostatectomy 9/9/22    Procedure  Flexible Cystourethroscopy    Attending surgeon  Jose Mercado MD    Anesthesia  2% lidocaine jelly intraurethrally    Complications  None    Indications  77 y.o. male undergoing a flexible cystoscopy for the above mentioned indications.  Informed consent was obtained.      Findings  Patient is noted to have a 6 Nepalese bulbar urethral stricture just distal to the urethral sphincter, unclear etiology, member sphincter is intact and his prostatic fossa is widely patent after surgery with some sloughing prostatic tissue and bladder debris.  18 Nepalese Christine catheter placed and bladder drained, 18 Nepalese catheter is advanced easily.    Urine culture sent.    Procedure  The patient was placed in supine position and prepped and draped in sterile fashion with lidocaine jelly instill per the urethra for anesthesia 5 minutes prior to procedure start.  A brief timeout was performed including available nursing staff and the patient.      The 16 Fr digital flexible cystoscope was lubricated and gently advanced into the urethral meatus.   The penile urethra was normal.  The bulbar urethra demonstrated a 6 Nepalese urethral stricture which appeared thin and annular, I was able to advance the flexible cystoscope with mild pressure through the stricture which opened easily and entering into the membranous sphincter which was preserved.  The distal prostatic urethra was intact and I entered into the prostatic fossa which was now widely patent as a result of his simple prostatectomy.  There was some sloughing prostate tissue present.  The bladder was full of moderate debris.    An 18 Nepalese Christine catheter was then advanced after removing the cystoscope and this easily bypassed  the area of urethral stricture into the bladder.  The patient's bladder was irrigated and all debris was removed and sent for culture.  I discussed with the patient necessity of leaving a Christine catheter in to let his urethral stricture heal however he is adamant he would like to avoid Christine catheterization again.  The catheter was removed.     The patient tolerated the procedure well.      The patient was given 500 mg ciprofloxacin x1 for UTI prevention today.  A formal prescription for Macrobid was sent    Assessment / Plan      Assessment/Plan:   77 y.o. male who presented today for follow up of urinary symptoms which are progressive after robotic simple prostatectomy for extreme BPH.  Patient has a history of prostate cancer incidentally identified and a small amount of tissue which is being monitored with repeat PSA and possible MRI in the future.    Patient has developed aggressive symptoms despite robotic simple prostatectomy.  He was taken for flexible cystoscopy emergently today, this demonstrates a bulbar urethral stricture which was thin and roughly estimated at 6 Moroccan.  This was able to be bypassed with gentle pressure with the 16 Moroccan flexible cystoscope.  I was able to easily bypassed an 18 Moroccan Christine catheter into the bladder and all debris was removed and patient's bladder was irrigated to clear.  Urine was sent for culture.  He was given ciprofloxacin and Macrobid prescription to start tomorrow to his pharmacy.    I discussed with the patient I do not know exactly what caused his bulbar urethral stricture, this could be as a result of previous cystoscopy or even catheter placement during his simple prostatectomy exacerbating some mucosal injury which resulted in a scar.  This could result in recurrence over time and he will need to monitor for urinary symptoms.  I asked him to urinate every couple hours today to ensure he is emptying    Diagnoses and all orders for this visit:    1.  Postprocedural bulbous urethral stricture (Primary)  -6-8 Serbian bulbar urethral stricture dilated with flexible cystoscope easily today, will return in early November for close follow-up  -Patient refuses intermittent catheterization or indwelling catheterization at this time    2. BPH with obstruction/lower urinary tract symptoms  -     POC Urinalysis Dipstick, Automated    3. Pyuria  -     nitrofurantoin, macrocrystal-monohydrate, (Macrobid) 100 MG capsule; Take 1 capsule by mouth 2 (Two) Times a Day.  Dispense: 10 capsule; Refill: 0  -     Urine Culture - Urine, Urine, Clean Catch; Future           Follow Up:   Return in about 26 days (around 11/1/2022).    I spent approximately 45 minutes providing clinical care for this patient; including review of patient's chart and provider documentation, face to face time spent with patient in examination room (obtaining history, performing physical exam, discussing diagnosis and management options), placing orders, and completing patient documentation.     Jose Mercado MD  [facility] [place]

## 2022-10-11 LAB
BACTERIA UR CULT: NO GROWTH
BACTERIA UR CULT: NORMAL

## 2022-10-24 ENCOUNTER — LAB (OUTPATIENT)
Dept: LAB | Facility: HOSPITAL | Age: 77
End: 2022-10-24

## 2022-10-24 PROCEDURE — 80053 COMPREHEN METABOLIC PANEL: CPT

## 2022-10-24 PROCEDURE — 36415 COLL VENOUS BLD VENIPUNCTURE: CPT

## 2022-10-24 PROCEDURE — 84153 ASSAY OF PSA TOTAL: CPT | Performed by: STUDENT IN AN ORGANIZED HEALTH CARE EDUCATION/TRAINING PROGRAM

## 2022-10-25 ENCOUNTER — PATIENT MESSAGE (OUTPATIENT)
Dept: INTERNAL MEDICINE | Facility: CLINIC | Age: 77
End: 2022-10-25

## 2022-10-25 LAB
ALBUMIN SERPL-MCNC: 3.9 G/DL (ref 3.5–5.2)
ALBUMIN/GLOB SERPL: 1.3 G/DL
ALP SERPL-CCNC: 88 U/L (ref 39–117)
ALT SERPL W P-5'-P-CCNC: 25 U/L (ref 1–41)
ANION GAP SERPL CALCULATED.3IONS-SCNC: 8.1 MMOL/L (ref 5–15)
AST SERPL-CCNC: 23 U/L (ref 1–40)
BILIRUB SERPL-MCNC: 0.2 MG/DL (ref 0–1.2)
BUN SERPL-MCNC: 15 MG/DL (ref 8–23)
BUN/CREAT SERPL: 18.8 (ref 7–25)
CALCIUM SPEC-SCNC: 9.3 MG/DL (ref 8.6–10.5)
CHLORIDE SERPL-SCNC: 102 MMOL/L (ref 98–107)
CO2 SERPL-SCNC: 26.9 MMOL/L (ref 22–29)
CREAT SERPL-MCNC: 0.8 MG/DL (ref 0.76–1.27)
EGFRCR SERPLBLD CKD-EPI 2021: 90.6 ML/MIN/1.73
GLOBULIN UR ELPH-MCNC: 3.1 GM/DL
GLUCOSE SERPL-MCNC: 76 MG/DL (ref 65–99)
POTASSIUM SERPL-SCNC: 4.3 MMOL/L (ref 3.5–5.2)
PROT SERPL-MCNC: 7 G/DL (ref 6–8.5)
SODIUM SERPL-SCNC: 137 MMOL/L (ref 136–145)

## 2022-10-25 NOTE — TELEPHONE ENCOUNTER
From: Wade Mcmanus  To: Cuate Solis MD  Sent: 10/25/2022 10:45 AM EDT  Subject: TEST RESULTS    What was the PSA number?    Kory

## 2022-11-01 ENCOUNTER — OFFICE VISIT (OUTPATIENT)
Dept: UROLOGY | Facility: CLINIC | Age: 77
End: 2022-11-01

## 2022-11-01 VITALS — BODY MASS INDEX: 19.37 KG/M2 | HEIGHT: 70 IN

## 2022-11-01 DIAGNOSIS — N40.1 BPH WITH OBSTRUCTION/LOWER URINARY TRACT SYMPTOMS: Primary | ICD-10-CM

## 2022-11-01 DIAGNOSIS — R35.1 NOCTURIA: ICD-10-CM

## 2022-11-01 DIAGNOSIS — N13.8 BPH WITH OBSTRUCTION/LOWER URINARY TRACT SYMPTOMS: Primary | ICD-10-CM

## 2022-11-01 LAB
BILIRUB BLD-MCNC: NEGATIVE MG/DL
CLARITY, POC: CLEAR
COLOR UR: YELLOW
EXPIRATION DATE: ABNORMAL
GLUCOSE UR STRIP-MCNC: NEGATIVE MG/DL
KETONES UR QL: NEGATIVE
LEUKOCYTE EST, POC: ABNORMAL
Lab: ABNORMAL
NITRITE UR-MCNC: NEGATIVE MG/ML
PH UR: 6.5 [PH] (ref 5–8)
PROT UR STRIP-MCNC: NEGATIVE MG/DL
RBC # UR STRIP: ABNORMAL /UL
SP GR UR: 1.01 (ref 1–1.03)
UROBILINOGEN UR QL: NORMAL

## 2022-11-01 PROCEDURE — 81003 URINALYSIS AUTO W/O SCOPE: CPT | Performed by: STUDENT IN AN ORGANIZED HEALTH CARE EDUCATION/TRAINING PROGRAM

## 2022-11-01 PROCEDURE — 51798 US URINE CAPACITY MEASURE: CPT | Performed by: STUDENT IN AN ORGANIZED HEALTH CARE EDUCATION/TRAINING PROGRAM

## 2022-11-01 PROCEDURE — 99214 OFFICE O/P EST MOD 30 MIN: CPT | Performed by: STUDENT IN AN ORGANIZED HEALTH CARE EDUCATION/TRAINING PROGRAM

## 2022-11-01 NOTE — PROGRESS NOTES
Follow Up Office Visit      Patient Name: Wade Mcmanus  : 1945   MRN: [id number]     Chief Complaint:    Chief Complaint   Patient presents with   • Benign Prostatic Hypertrophy     With obstruction/LUTS         Referring Provider: No ref. provider found    History of Present Illness: Wade Mcmanus is a 77 y.o. male who presents today for follow up regarding extreme BPH and lower urinary tract symptoms. Relevant history is below:     The patient has a history of extreme BPH.  He originally saw me in 2022.  He was struggling with severe urinary symptoms, IPSS 28.  Evidence of incomplete bladder emptying at that time with  mL.  Normal PSA trend.  He underwent flexible cystoscopy 2022, this demonstrates massive trilobar hyperplasia, prostate size on previous  cc.  The patient elected to proceed to the operating room for robotic simple prostatectomy.  Patient was taken to the operating room 2022 for robotic simple prostatectomy.  Procedure was uncomplicated.  He was discharged with Christine catheter and returned to my clinic 2022 for catheter removal.  Surgical pathology demonstrated small amount of Kristopher 3+4 equal 7 prostate cancer incidentally identified in his prostate specimen.    At his last follow-up 10/6/2022 the patient complained of difficulty urinating and elevated PVR.  He underwent clinic cystoscopy which demonstrated bulbar urethral stricture, possibly related to catheter advancement during his previous surgery.  His prostatic fossa was wide open.  Patient was also noted to have trabeculation and distended bladder indicative of possible bladder changes secondary to obstruction.  His bulbar urethral stricture was dilated in clinic with a flexible cystoscopy.    Since I have last seen him he reported for the first few weeks he was urinating well but continues to have weak stream and urgency, frequency with nocturia every hour at night.    His postvoid residual  bladder volume was under 26 mL.  Previously the patient has refused to learn intermittent catheterization.    Urinalysis is positive for blood and leukocytes but he denies infectious symptoms at this time.    IPSS is 30, symptoms appear to be worsening.     Subjective      Review of System: Review of Systems   Constitutional: Negative for chills, fatigue, fever and unexpected weight change.   HENT: Negative for sore throat.    Eyes: Negative for visual disturbance.   Respiratory: Negative for cough, chest tightness and shortness of breath.    Cardiovascular: Negative for chest pain and leg swelling.   Gastrointestinal: Negative for blood in stool, constipation, diarrhea, nausea, rectal pain and vomiting.   Genitourinary: Positive for frequency and urgency. Negative for decreased urine volume, difficulty urinating, dysuria, enuresis, flank pain, genital sores and hematuria.   Musculoskeletal: Negative for back pain and joint swelling.   Skin: Negative for rash and wound.   Neurological: Negative for seizures, speech difficulty, weakness and headaches.   Psychiatric/Behavioral: Negative for confusion, sleep disturbance and suicidal ideas. The patient is not nervous/anxious.       I have reviewed the ROS documented by my clinical staff, I have updated appropriately and I agree. Jose Mercado MD    I have reviewed and the following portions of the patient's history were updated as appropriate: past family history, past medical history, past social history, past surgical history and problem list.    Medications:     Current Outpatient Medications:   •  acetaminophen (TYLENOL) 325 MG tablet, Take 650 mg by mouth As Needed for Mild Pain ., Disp: , Rfl:   •  aspirin 325 MG tablet, Take 1 tablet by mouth 3 (Three) Times a Day. Taking 1  tablet in AM and 1 tablet at noon right now, Disp: , Rfl:   •  Mirabegron ER (MYRBETRIQ) 25 MG tablet sustained-release 24 hour 24 hr tablet, Take 1 tablet by mouth Daily., Disp: 30  tablet, Rfl: 0    Allergies:   Allergies   Allergen Reactions   • Amoxicillin Rash   • Clindamycin Rash   • Penicillins Rash   • Sulfa Antibiotics Rash       IPSS Questionnaire (AUA-7):  Over the past month…    1)  Incomplete Emptying:       How often have you had a sensation of not emptying you had the sensation of not emptying your bladder completely after you finished urinating?  5 - Almost always   2)  Frequency:       How often have you had the urinate again less than two hours after you finished urinating?  5 - Almost always   3)  Intermittency:       How often have you found you stopped and started again several times when you urinated?   5 - Almost always   4) Urgency:      How often have you found it difficult to postpone urination?  5 - Almost always   5) Weak Stream:      How often have you had a weak urinary stream?  5 - Almost always   6) Straining:       How often have you had to push or strain to begin urination?  0 - Not at all   7) Nocturia:      How many times did you most typically get up to urinate from the time you went to bed at night until the time you got up in the morning?  5 - 5+ times   Total Score:  30   The International Prostate Symptom Score (IPSS) is used to screen, diagnose, track symptoms of benign prostatic hyperplasia (BPH).   0-7 (Mild Symptoms) 8-19 (Moderate) 20-35 (Severe)   Quality of Life (QoL):  If you were to spend the rest of your life with your urinary condition just the way it is now, how would you feel about that? 6-Terrible   Urine Leakage (Incontinence) 0-No Leakage     Sexual Health Inventory for Men (MADDY)   Over the past 6 months:     1. How do you rate your confidence that you could get and keep an erection?  0 - No sexual activity    2. When you had erections with sexual  stimulation, how often were your erections hard enough for penetration (entering your partner)?  0 - No Sexual Activity    3. During sexual intercourse, how often were you able to maintain your  "erection after you had penetrated (entered) your partner?  0 - Did not attempt intercourse   4. During sexual intercourse, how difficult was it to maintain your erection to completion of intercourse?  0 - Did not attempt intercourse   5. When you attempted sexual intercourse, how often was it satisfactory for you?  0 - Did not attempt intercourse    Total Score: 0   The Sexual Health Inventory for Men further classifies ED severity with the following breakpoints:   1-7 (Severe ED) 8-11 (Moderate ED) 12-16 (Mild to Moderate ED) 17-21 (Mild ED)      Post void residual bladder scan:   126 mL     Objective     Physical Exam:   Vital Signs:   Vitals:    11/01/22 1015   Height: 177.8 cm (70\")     Body mass index is 19.37 kg/m².     Physical Exam  Constitutional:       Appearance: Normal appearance.   HENT:      Head: Normocephalic and atraumatic.      Nose: Nose normal.   Eyes:      Extraocular Movements: Extraocular movements intact.      Conjunctiva/sclera: Conjunctivae normal.      Pupils: Pupils are equal, round, and reactive to light.   Musculoskeletal:         General: Normal range of motion.      Cervical back: Normal range of motion and neck supple.   Skin:     General: Skin is warm and dry.      Findings: No lesion or rash.   Neurological:      General: No focal deficit present.      Mental Status: He is alert and oriented to person, place, and time. Mental status is at baseline.   Psychiatric:         Mood and Affect: Mood normal.         Behavior: Behavior normal.         Labs:   Brief Urine Lab Results  (Last result in the past 365 days)      Color   Clarity   Blood   Leuk Est   Nitrite   Protein   CREAT   Urine HCG        11/01/22 1027 Yellow   Clear   2+   Moderate (2+)   Negative   Negative                 Urine Culture    Urine Culture 10/6/22   Urine Culture Final report              Lab Results   Component Value Date    GLUCOSE 76 10/24/2022    CALCIUM 9.3 10/24/2022     10/24/2022    K 4.3 " 10/24/2022    CO2 26.9 10/24/2022     10/24/2022    BUN 15 10/24/2022    CREATININE 0.80 10/24/2022    EGFRIFAFRI 133 09/26/2019    EGFRIFNONA 110 09/26/2019    BCR 18.8 10/24/2022    ANIONGAP 8.1 10/24/2022       Lab Results   Component Value Date    WBC 9.65 09/10/2022    HGB 12.6 (L) 09/10/2022    HCT 37.2 (L) 09/10/2022    MCV 98.2 (H) 09/10/2022     09/10/2022       Images:   CT Abdomen Pelvis With & Without Contrast    Result Date: 7/20/2022   1.  Bilateral nonobstructing renal stones.  No evidence of ureteral stone or hydronephrosis. 2.  Diffusely enlarged prostate gland with trabeculated appearance of the urinary bladder which can be seen with chronic bladder outlet obstruction.  This report was finalized on 7/20/2022 8:32 AM by Arik Ledesma MD.        Measures:   Tobacco:   Wade Mcmanus  reports that he quit smoking about 49 years ago. His smoking use included cigarettes. He started smoking about 64 years ago. He has a 15.00 pack-year smoking history. He has never used smokeless tobacco..      Assessment / Plan      Assessment/Plan:   77 y.o. male who presented today for follow up of BPH and lower urinary tract symptoms status post robotic simple prostatectomy, complicated by postoperative bulbar urethral stricture status post dilation in clinic.  Symptoms are persistent and worsening, and he reports a weak urinary stream with urgency and frequency.  I discussed the possibility of a recurrent urethral stricture which may require intermittent catheterization to keep open.  The patient is not willing to learn self-catheterization.  I discussed with the patient we are limited in terms of available options for his ongoing symptoms other than beta 3 agonist with Myrbetriq for his ongoing frequency, urgency and nocturia.  We discussed this may make it harder for him to urinate.  He will monitor for symptoms and repeat flexible cystoscopy to assess stricture recurrence in future.  If he is unwilling  to learn catheterization, he will likely continue to experience incomplete bladder emptying therefore we will continue to follow him with BMP to ensure stable renal function.  Discussed the risk of ongoing urinary tract infection which we will monitor for.    Diagnoses and all orders for this visit:    1. BPH with obstruction/lower urinary tract symptoms (Primary)  -     POC Urinalysis Dipstick, Automated  -     Mirabegron ER (MYRBETRIQ) 25 MG tablet sustained-release 24 hour 24 hr tablet; Take 1 tablet by mouth Daily.  Dispense: 30 tablet; Refill: 0  -     Basic Metabolic Panel; Future    2. Nocturia  -     Mirabegron ER (MYRBETRIQ) 25 MG tablet sustained-release 24 hour 24 hr tablet; Take 1 tablet by mouth Daily.  Dispense: 30 tablet; Refill: 0  -     Basic Metabolic Panel; Future           Follow Up:   Return in about 3 months (around 2/1/2023) for Follow Up after Labs.    I spent approximately 30 minutes providing clinical care for this patient; including review of patient's chart and provider documentation, face to face time spent with patient in examination room (obtaining history, performing physical exam, discussing diagnosis and management options), placing orders, and completing patient documentation.     Jose Mercado MD  [facility] [place] [facility] [place]

## 2023-01-24 ENCOUNTER — LAB (OUTPATIENT)
Dept: LAB | Facility: HOSPITAL | Age: 78
End: 2023-01-24
Payer: MEDICARE

## 2023-01-24 DIAGNOSIS — N13.8 BPH WITH OBSTRUCTION/LOWER URINARY TRACT SYMPTOMS: ICD-10-CM

## 2023-01-24 DIAGNOSIS — N40.1 BPH WITH OBSTRUCTION/LOWER URINARY TRACT SYMPTOMS: ICD-10-CM

## 2023-01-24 DIAGNOSIS — R35.1 NOCTURIA: ICD-10-CM

## 2023-01-24 LAB
CYTO UR: NORMAL
LAB AP CASE REPORT: NORMAL
LAB AP CLINICAL INFORMATION: NORMAL
LAB AP DIAGNOSIS COMMENT: NORMAL
PATH REPORT.ADDENDUM SPEC: NORMAL
PATH REPORT.FINAL DX SPEC: NORMAL
PATH REPORT.GROSS SPEC: NORMAL

## 2023-01-25 LAB
BUN SERPL-MCNC: 21 MG/DL (ref 8–23)
BUN/CREAT SERPL: 23.9 (ref 7–25)
CALCIUM SERPL-MCNC: 9.7 MG/DL (ref 8.6–10.5)
CHLORIDE SERPL-SCNC: 102 MMOL/L (ref 98–107)
CO2 SERPL-SCNC: 31.3 MMOL/L (ref 22–29)
CREAT SERPL-MCNC: 0.88 MG/DL (ref 0.76–1.27)
EGFRCR SERPLBLD CKD-EPI 2021: 88 ML/MIN/1.73
GLUCOSE SERPL-MCNC: 75 MG/DL (ref 65–99)
POTASSIUM SERPL-SCNC: 5.3 MMOL/L (ref 3.5–5.2)
SODIUM SERPL-SCNC: 139 MMOL/L (ref 136–145)

## 2023-03-02 ENCOUNTER — OFFICE VISIT (OUTPATIENT)
Dept: UROLOGY | Facility: CLINIC | Age: 78
End: 2023-03-02
Payer: MEDICARE

## 2023-03-02 VITALS
SYSTOLIC BLOOD PRESSURE: 134 MMHG | OXYGEN SATURATION: 95 % | HEIGHT: 70 IN | HEART RATE: 64 BPM | BODY MASS INDEX: 21.47 KG/M2 | WEIGHT: 150 LBS | DIASTOLIC BLOOD PRESSURE: 78 MMHG

## 2023-03-02 DIAGNOSIS — R35.1 NOCTURIA: Primary | ICD-10-CM

## 2023-03-02 DIAGNOSIS — N40.1 BPH WITH OBSTRUCTION/LOWER URINARY TRACT SYMPTOMS: ICD-10-CM

## 2023-03-02 DIAGNOSIS — C61 PROSTATE CANCER: ICD-10-CM

## 2023-03-02 DIAGNOSIS — E87.5 HYPERKALEMIA: ICD-10-CM

## 2023-03-02 DIAGNOSIS — N13.8 BPH WITH OBSTRUCTION/LOWER URINARY TRACT SYMPTOMS: ICD-10-CM

## 2023-03-02 LAB
BILIRUB BLD-MCNC: NEGATIVE MG/DL
CLARITY, POC: CLEAR
COLOR UR: YELLOW
EXPIRATION DATE: ABNORMAL
GLUCOSE UR STRIP-MCNC: NEGATIVE MG/DL
KETONES UR QL: NEGATIVE
LEUKOCYTE EST, POC: ABNORMAL
Lab: ABNORMAL
NITRITE UR-MCNC: NEGATIVE MG/ML
PH UR: 6 [PH] (ref 5–8)
PROT UR STRIP-MCNC: NEGATIVE MG/DL
RBC # UR STRIP: NEGATIVE /UL
SP GR UR: 1.01 (ref 1–1.03)
UROBILINOGEN UR QL: NORMAL

## 2023-03-02 PROCEDURE — 81003 URINALYSIS AUTO W/O SCOPE: CPT | Performed by: STUDENT IN AN ORGANIZED HEALTH CARE EDUCATION/TRAINING PROGRAM

## 2023-03-02 PROCEDURE — 99213 OFFICE O/P EST LOW 20 MIN: CPT | Performed by: STUDENT IN AN ORGANIZED HEALTH CARE EDUCATION/TRAINING PROGRAM

## 2023-03-02 PROCEDURE — 51798 US URINE CAPACITY MEASURE: CPT | Performed by: STUDENT IN AN ORGANIZED HEALTH CARE EDUCATION/TRAINING PROGRAM

## 2023-03-02 NOTE — PROGRESS NOTES
Follow Up Office Visit      Patient Name: Wade Mcmanus  : 1945   MRN: [id number]     Chief Complaint:    Chief Complaint   Patient presents with   • Benign Prostatic Hypertrophy     LUTS     • Nocturia       Referring Provider: No ref. provider found    History of Present Illness: Wade Mcmanus is a 78 y.o. male who presents today for follow up of BPH and urinary obstruction s/p robotic simple prostatectomy.    The patient has a history of extreme BPH.  He originally saw me in 2022.  He was struggling with severe urinary symptoms, IPSS 28.  Evidence of incomplete bladder emptying at that time with  mL.  Normal PSA trend.  He underwent flexible cystoscopy 2022, this demonstrates massive trilobar hyperplasia, prostate size on previous  cc.  The patient elected to proceed to the operating room for robotic simple prostatectomy.  Patient was taken to the operating room 2022 for robotic simple prostatectomy.  Procedure was uncomplicated.  He was discharged with Christine catheter and returned to my clinic 2022 for catheter removal.  Surgical pathology demonstrated small amount of [place] 3+4 equal 7 prostate cancer incidentally identified in his prostate specimen.    Patient has been found to have a post operative thin annular bulbar urethral stricture that required passive dilation with catheter in clinic Oct 2022.     At last visit in Nov, patient's IPSS was severe at 30. He was prescribed Myrbetriq sample trial which improved his ongoing urgency and frequency.     Today, patient reports improvement in his symptoms.  He reports less urgency and frequency.  He reports nocturia only 3 times a night.  He reports a much stronger stream.  He thinks he is doing overall well.  His IPSS is improving at 22, mixed quality of life.    Will repeat PSA and BMP given recent hyperkalemia, renal function is stable and patient reports improving strength of stream, IPSS improved somewhat.  Postvoid  residual bladder scan still mildly elevated at 106 ml.    Urinalysis is clear.    Recent BMP 1/24/2023 demonstrates mildly elevated creatinine but normal GFR, he did have hyperkalemia with K+ 5.3.  Also found to have alkalosis.  He is due for recheck BMP.    Subjective      Review of System: Review of Systems   Genitourinary: Positive for frequency. Negative for decreased urine volume, dysuria, enuresis, flank pain, hematuria and urgency.      I have reviewed the ROS documented by my clinical staff, I have updated appropriately and I agree. Jose Mercado MD    I have reviewed and the following portions of the patient's history were updated as appropriate: past family history, past medical history, past social history, past surgical history and problem list.    Medications:     Current Outpatient Medications:   •  acetaminophen (TYLENOL) 325 MG tablet, Take 2 tablets by mouth As Needed for Mild Pain., Disp: , Rfl:   •  aspirin 325 MG tablet, Take 1 tablet by mouth 3 (Three) Times a Day. Taking 1  tablet in AM and 1 tablet at noon right now, Disp: , Rfl:     Allergies:   Allergies   Allergen Reactions   • Amoxicillin Rash   • Clindamycin Rash   • Penicillins Rash   • Sulfa Antibiotics Rash       IPSS Questionnaire (AUA-7):  Over the past month…    1)  Incomplete Emptying:       How often have you had a sensation of not emptying you had the sensation of not emptying your bladder completely after you finished urinating?  3 - About half the time   2)  Frequency:       How often have you had the urinate again less than two hours after you finished urinating?  3 - About half the time   3)  Intermittency:       How often have you found you stopped and started again several times when you urinated?   3 - About half the time   4) Urgency:      How often have you found it difficult to postpone urination?  3 - About half the time   5) Weak Stream:      How often have you had a weak urinary stream?  3 - About half the time  "  6) Straining:       How often have you had to push or strain to begin urination?  3 - About half the time   7) Nocturia:      How many times did you most typically get up to urinate from the time you went to bed at night until the time you got up in the morning?  4 - 4 times   Total Score:  22   The International Prostate Symptom Score (IPSS) is used to screen, diagnose, track symptoms of benign prostatic hyperplasia (BPH).   0-7 (Mild Symptoms) 8-19 (Moderate) 20-35 (Severe)   Quality of Life (QoL):  If you were to spend the rest of your life with your urinary condition just the way it is now, how would you feel about that? 3-Mixed   Urine Leakage (Incontinence) 0-No Leakage     Sexual Health Inventory for Men (MADDY)   Over the past 6 months:     1. How do you rate your confidence that you could get and keep an erection?  0 - No sexual activity    2. When you had erections with sexual  stimulation, how often were your erections hard enough for penetration (entering your partner)?  0 - No Sexual Activity    3. During sexual intercourse, how often were you able to maintain your erection after you had penetrated (entered) your partner?  0 - Did not attempt intercourse   4. During sexual intercourse, how difficult was it to maintain your erection to completion of intercourse?  0 - Did not attempt intercourse   5. When you attempted sexual intercourse, how often was it satisfactory for you?  0 - Did not attempt intercourse    Total Score: 0   The Sexual Health Inventory for Men further classifies ED severity with the following breakpoints:   1-7 (Severe ED) 8-11 (Moderate ED) 12-16 (Mild to Moderate ED) 17-21 (Mild ED)      Post void residual bladder scan:   106 mL     Objective     Physical Exam:   Vital Signs:   Vitals:    03/02/23 1425   BP: 134/78   Pulse: 64   SpO2: 95%   Weight: 68 kg (150 lb)   Height: 177.8 cm (70\")     Body mass index is 21.52 kg/m².     Physical Exam  Constitutional:       Appearance: Normal " appearance.   HENT:      Head: Normocephalic and atraumatic.      Nose: Nose normal.   Eyes:      Extraocular Movements: Extraocular movements intact.      Conjunctiva/sclera: Conjunctivae normal.      Pupils: Pupils are equal, round, and reactive to light.   Musculoskeletal:         General: Normal range of motion.      Cervical back: Normal range of motion and neck supple.   Skin:     General: Skin is warm and dry.      Findings: No lesion or rash.   Neurological:      General: No focal deficit present.      Mental Status: He is alert and oriented to person, place, and time. Mental status is at baseline.   Psychiatric:         Mood and Affect: Mood normal.         Behavior: Behavior normal.         Labs:   Brief Urine Lab Results  (Last result in the past 365 days)      Color   Clarity   Blood   Leuk Est   Nitrite   Protein   CREAT   Urine HCG        03/02/23 1440 Yellow   Clear   Negative   Trace   Negative   Negative                 Urine Culture    Urine Culture 10/6/22   Urine Culture Final report              Lab Results   Component Value Date    GLUCOSE 75 01/24/2023    CALCIUM 9.7 01/24/2023     01/24/2023    K 5.3 (H) 01/24/2023    CO2 31.3 (H) 01/24/2023     01/24/2023    BUN 21 01/24/2023    CREATININE 0.88 01/24/2023    EGFRIFAFRI 133 09/26/2019    EGFRIFNONA 110 09/26/2019    BCR 23.9 01/24/2023    ANIONGAP 8.1 10/24/2022       Lab Results   Component Value Date    WBC 9.65 09/10/2022    HGB 12.6 (L) 09/10/2022    HCT 37.2 (L) 09/10/2022    MCV 98.2 (H) 09/10/2022     09/10/2022       Images:   No Images in the past 120 days found..    Measures:   Tobacco:   Wade Mcmanus  reports that he quit smoking about 50 years ago. His smoking use included cigarettes. He started smoking about 65 years ago. He has a 15.00 pack-year smoking history. He has never used smokeless tobacco.      Assessment / Plan      Assessment/Plan:   78 y.o. male who presented today for follow up of extreme BPH  status post robotic simple prostatectomy September 2022, complicated by postoperative bulbar urethral stricture which required urethral dilation with Christine catheter placement in October, unclear etiology.  Possibly related to catheter placement during surgery.  His prostatic fossa was wide open on cystoscopy in October.  He has had some progressive urinary tract symptoms but these are improving the further out he gets from his procedure.  He is no longer on any medications.  He would like to monitor his symptoms.  We will see him back in 6 months with a repeat PVR.  We discussed following up with a repeat cystoscopy to rule out recurrent urethral stricture however the patient would like to defer at this time.  We will have him repeat a BMP at this time.  He does have a very small amount of intermediate risk prostate cancer identified incidentally on his specimen and his PSA is very normal at this time we will continue to follow his PSA rather than repeat a prostate biopsy or any further imaging at this time, he is very unlikely to have complications associated with this.  The vast majority of his prostate specimen was benign.    Diagnoses and all orders for this visit:    1. Nocturia (Primary)  -     POC Urinalysis Dipstick, Automated    2. BPH with obstruction/lower urinary tract symptoms  -     POC Urinalysis Dipstick, Automated    3. Prostate cancer (HCC)  -     PSA Diagnostic; Future    4. Hyperkalemia  -     Basic Metabolic Panel; Future           Follow Up:   Return in about 6 months (around 9/2/2023).    I spent approximately 30 minutes providing clinical care for this patient; including review of patient's chart and provider documentation, face to face time spent with patient in examination room (obtaining history, performing physical exam, discussing diagnosis and management options), placing orders, and completing patient documentation.     Jose Mercado MD  [facility]

## 2023-03-07 ENCOUNTER — LAB (OUTPATIENT)
Dept: LAB | Facility: HOSPITAL | Age: 78
End: 2023-03-07
Payer: MEDICARE

## 2023-03-07 PROCEDURE — 80048 BASIC METABOLIC PNL TOTAL CA: CPT | Performed by: STUDENT IN AN ORGANIZED HEALTH CARE EDUCATION/TRAINING PROGRAM

## 2023-03-07 PROCEDURE — 84153 ASSAY OF PSA TOTAL: CPT | Performed by: STUDENT IN AN ORGANIZED HEALTH CARE EDUCATION/TRAINING PROGRAM

## 2023-03-08 ENCOUNTER — TELEPHONE (OUTPATIENT)
Dept: UROLOGY | Facility: CLINIC | Age: 78
End: 2023-03-08
Payer: MEDICARE

## 2023-03-08 NOTE — TELEPHONE ENCOUNTER
I called the patient to let him know his test results looked good and that Dr. Mercado is pleased with them.  I got his voicemail so I left him a message and I will send him a my chart message as well.

## 2023-03-08 NOTE — TELEPHONE ENCOUNTER
----- Message from Jose Mercado MD sent at 3/8/2023  7:53 AM EST -----  Please let patient know his kidney function looks great and his PSA level is very low and still dropping after his robotic simple prostatectomy. No concerns on my end.     oJse Mercado MD

## 2023-09-01 ENCOUNTER — OFFICE VISIT (OUTPATIENT)
Dept: INTERNAL MEDICINE | Facility: CLINIC | Age: 78
End: 2023-09-01
Payer: MEDICARE

## 2023-09-01 ENCOUNTER — LAB (OUTPATIENT)
Dept: LAB | Facility: HOSPITAL | Age: 78
End: 2023-09-01
Payer: MEDICARE

## 2023-09-01 VITALS
SYSTOLIC BLOOD PRESSURE: 150 MMHG | WEIGHT: 132.6 LBS | HEIGHT: 70 IN | DIASTOLIC BLOOD PRESSURE: 80 MMHG | HEART RATE: 72 BPM | TEMPERATURE: 98.2 F | BODY MASS INDEX: 18.98 KG/M2

## 2023-09-01 DIAGNOSIS — I10 ESSENTIAL HYPERTENSION: Primary | Chronic | ICD-10-CM

## 2023-09-01 DIAGNOSIS — Z13.21 ENCOUNTER FOR VITAMIN DEFICIENCY SCREENING: ICD-10-CM

## 2023-09-01 DIAGNOSIS — M25.561 RIGHT KNEE PAIN, UNSPECIFIED CHRONICITY: ICD-10-CM

## 2023-09-01 DIAGNOSIS — E78.2 MIXED HYPERLIPIDEMIA: Chronic | ICD-10-CM

## 2023-09-01 DIAGNOSIS — M16.0 PRIMARY OSTEOARTHRITIS OF BOTH HIPS: Chronic | ICD-10-CM

## 2023-09-01 DIAGNOSIS — I10 ESSENTIAL HYPERTENSION: Chronic | ICD-10-CM

## 2023-09-01 LAB
BASOPHILS # BLD AUTO: 0.03 10*3/MM3 (ref 0–0.2)
BASOPHILS NFR BLD AUTO: 0.4 % (ref 0–1.5)
DEPRECATED RDW RBC AUTO: 43.2 FL (ref 37–54)
EOSINOPHIL # BLD AUTO: 0.1 10*3/MM3 (ref 0–0.4)
EOSINOPHIL NFR BLD AUTO: 1.3 % (ref 0.3–6.2)
ERYTHROCYTE [DISTWIDTH] IN BLOOD BY AUTOMATED COUNT: 13 % (ref 12.3–15.4)
HCT VFR BLD AUTO: 39.7 % (ref 37.5–51)
HGB BLD-MCNC: 13.7 G/DL (ref 13–17.7)
IMM GRANULOCYTES # BLD AUTO: 0.02 10*3/MM3 (ref 0–0.05)
IMM GRANULOCYTES NFR BLD AUTO: 0.3 % (ref 0–0.5)
LYMPHOCYTES # BLD AUTO: 2.22 10*3/MM3 (ref 0.7–3.1)
LYMPHOCYTES NFR BLD AUTO: 29.6 % (ref 19.6–45.3)
MCH RBC QN AUTO: 32.2 PG (ref 26.6–33)
MCHC RBC AUTO-ENTMCNC: 34.5 G/DL (ref 31.5–35.7)
MCV RBC AUTO: 93.4 FL (ref 79–97)
MONOCYTES # BLD AUTO: 0.62 10*3/MM3 (ref 0.1–0.9)
MONOCYTES NFR BLD AUTO: 8.3 % (ref 5–12)
NEUTROPHILS NFR BLD AUTO: 4.51 10*3/MM3 (ref 1.7–7)
NEUTROPHILS NFR BLD AUTO: 60.1 % (ref 42.7–76)
NRBC BLD AUTO-RTO: 0.1 /100 WBC (ref 0–0.2)
PLATELET # BLD AUTO: 370 10*3/MM3 (ref 140–450)
PMV BLD AUTO: 9.9 FL (ref 6–12)
RBC # BLD AUTO: 4.25 10*6/MM3 (ref 4.14–5.8)
WBC NRBC COR # BLD: 7.5 10*3/MM3 (ref 3.4–10.8)

## 2023-09-01 PROCEDURE — 36415 COLL VENOUS BLD VENIPUNCTURE: CPT

## 2023-09-01 PROCEDURE — 99214 OFFICE O/P EST MOD 30 MIN: CPT | Performed by: STUDENT IN AN ORGANIZED HEALTH CARE EDUCATION/TRAINING PROGRAM

## 2023-09-01 PROCEDURE — 85025 COMPLETE CBC W/AUTO DIFF WBC: CPT

## 2023-09-01 PROCEDURE — 82607 VITAMIN B-12: CPT

## 2023-09-01 PROCEDURE — 83921 ORGANIC ACID SINGLE QUANT: CPT

## 2023-09-01 PROCEDURE — 80053 COMPREHEN METABOLIC PANEL: CPT

## 2023-09-01 RX ORDER — MELOXICAM 7.5 MG/1
1 TABLET ORAL DAILY
COMMUNITY
Start: 2023-08-18 | End: 2023-09-01 | Stop reason: SINTOL

## 2023-09-01 RX ORDER — CELECOXIB 200 MG/1
200 CAPSULE ORAL 2 TIMES DAILY PRN
Qty: 60 CAPSULE | Refills: 1 | Status: SHIPPED | OUTPATIENT
Start: 2023-09-01

## 2023-09-01 NOTE — PROGRESS NOTES
Chief Complaint  Wade Mcmanus is a 78 y.o. male presenting for Knee Pain.     From [facility]. Lived in [place] most of his life. . Lives with his daughter. Also has son in [place]. Worked as  in the past, still working from home.     Patient has a past medical history of hypertension off medications, severe BPH s/p prostatectomy with incidental prostate cancer ([place] 3+4 = 7), anxiety disorder, hyperlipidemia and actinic keratosis.    History of Present Illness  Patient is here for evaluation of his right knee.  He had surgery about 10 weeks ago at [facility].  He is scheduled for next follow-up in about 2 months.  He recently saw them due to his continued pain.  He reports having pain immediately after his surgery of the posterior and medial knee.  He has been doing PT, and when he saw Ortho they told him to stop PT for now.  He tells me that the physical therapist used ultrasound and rubbed over his medial and lateral knee, and this caused worsening pain afterwards.  He is not reporting any fever or chills, but he states his knee is very warm and has remained warm since he had surgery.  He uses ice packs to cool it down as needed.  He reports significant pain of the medial side and the posterior knee.  Also feels numbness throughout his leg below the knee, but no specific numbness localizing to toes or specific area.  He reports discomfort when sitting down and resting the posterior distal thigh against the chair.    He did not want to take any other controlled medications.  He has been taking meloxicam that that upsets his stomach and makes him nauseous.  He also takes Tylenol.    The following portions of the patient's history were reviewed and updated as appropriate: allergies, current medications, past family history, past medical history, past social history, past surgical history, and problem list.    Objective  /80 (BP Location: Left arm, Patient Position:  "Sitting, Cuff Size: Adult)   Pulse 72   Temp 98.2 øF (36.8 øC) (Temporal)   Ht 177.8 cm (70\")   Wt 60.1 kg (132 lb 9.6 oz)   BMI 19.03 kg/mý     Physical Exam  Constitutional:       Appearance: Normal appearance.   HENT:      Head: Normocephalic and atraumatic.   Eyes:      Extraocular Movements: Extraocular movements intact.      Conjunctiva/sclera: Conjunctivae normal.   Pulmonary:      Effort: Pulmonary effort is normal. No respiratory distress.   Musculoskeletal:         General: Swelling and tenderness present.      Cervical back: Neck supple.      Right lower leg: No edema.      Left lower leg: No edema.   Neurological:      Mental Status: He is alert and oriented to person, place, and time. Mental status is at baseline.   Psychiatric:         Behavior: Behavior normal.         Thought Content: Thought content normal.       Assessment/Plan   1. Right knee pain, unspecified chronicity  2. Primary osteoarthritis of both hips  Patient tells me they were not concerned for infection.  His knee is obviously warm.  We will do check of his CBC.  We will do trial of Celebrex instead of meloxicam due to side effects.  He can continue taking Tylenol up to 1000 mg every 6 hours as needed.  Recommend continued follow-up by Ortho.  I do not think a neurologist would give him good answers, I suspect the numbness may be related to the diffuse swelling of his knee, and hopefully that will improve.  If he is not happy with the follow-up by Ortho I can potentially refer him for second opinion with other surgeon.  - celecoxib (CeleBREX) 200 MG capsule; Take 1 capsule by mouth 2 (Two) Times a Day As Needed for Moderate Pain.  Dispense: 60 capsule; Refill: 1  - CBC & Differential; Future    3. Essential hypertension  BP Readings from Last 3 Encounters:   09/01/23 150/80   03/02/23 134/78   09/10/22 121/64   Blood pressure slightly elevated today.  We will follow-up on repeat with annual physical.  - Comprehensive Metabolic " Panel; Future    4. Mixed hyperlipidemia  Patient is due for recheck of his lipids.  He wants to hold off for now and just do the blood work that I ordered today.    5. Encounter for vitamin deficiency screening  Elevated MCV and borderline anemic in the past.  We will recheck vitamin B12  - Vitamin B12; Future  - Methylmalonic Acid, Serum; Future      Advance Care Planning   ACP discussion was held with the patient during this visit. Patient has an advance directive in EMR which is still valid.        BMI is within normal parameters. No other follow-up for BMI required.      Return in about 1 month (around 10/1/2023) for Medicare Wellness.    Future Appointments         Provider Department Center    9/1/2023 4:05 PM LAB [facility]    9/6/2023 2:00 PM Jose Mercado MD [facility] UROLOGY [facility]    10/18/2023 3:30 PM Cuate Solis MD [facility] INTERNAL MEDICINE [facility]            Cuate Solis MD  Family Medicine  09/01/2023

## 2023-09-02 LAB
ALBUMIN SERPL-MCNC: 4.4 G/DL (ref 3.5–5.2)
ALBUMIN/GLOB SERPL: 1.3 G/DL
ALP SERPL-CCNC: 89 U/L (ref 39–117)
ALT SERPL W P-5'-P-CCNC: 16 U/L (ref 1–41)
ANION GAP SERPL CALCULATED.3IONS-SCNC: 11.6 MMOL/L (ref 5–15)
AST SERPL-CCNC: 29 U/L (ref 1–40)
BILIRUB SERPL-MCNC: 0.4 MG/DL (ref 0–1.2)
BUN SERPL-MCNC: 21 MG/DL (ref 8–23)
BUN/CREAT SERPL: 27.3 (ref 7–25)
CALCIUM SPEC-SCNC: 10.2 MG/DL (ref 8.6–10.5)
CHLORIDE SERPL-SCNC: 100 MMOL/L (ref 98–107)
CO2 SERPL-SCNC: 22.4 MMOL/L (ref 22–29)
CREAT SERPL-MCNC: 0.77 MG/DL (ref 0.76–1.27)
EGFRCR SERPLBLD CKD-EPI 2021: 91.1 ML/MIN/1.73
GLOBULIN UR ELPH-MCNC: 3.5 GM/DL
GLUCOSE SERPL-MCNC: 66 MG/DL (ref 65–99)
POTASSIUM SERPL-SCNC: 4.9 MMOL/L (ref 3.5–5.2)
PROT SERPL-MCNC: 7.9 G/DL (ref 6–8.5)
SODIUM SERPL-SCNC: 134 MMOL/L (ref 136–145)

## 2023-09-05 LAB — SPECIMEN STATUS: NORMAL

## 2023-09-07 LAB
METHYLMALONATE SERPL-SCNC: 195 NMOL/L (ref 0–378)
VIT B12 SERPL-MCNC: 1027 PG/ML (ref 232–1245)

## 2023-09-18 ENCOUNTER — PATIENT MESSAGE (OUTPATIENT)
Dept: INTERNAL MEDICINE | Facility: CLINIC | Age: 78
End: 2023-09-18
Payer: MEDICARE

## 2023-09-18 DIAGNOSIS — Z96.651 HISTORY OF TOTAL RIGHT KNEE REPLACEMENT: ICD-10-CM

## 2023-09-18 DIAGNOSIS — M17.11 PRIMARY OSTEOARTHRITIS OF RIGHT KNEE: Primary | Chronic | ICD-10-CM

## 2024-05-14 NOTE — ANESTHESIA PROCEDURE NOTES
Airway  Urgency: elective    Date/Time: 9/9/2022 7:48 AM  Airway not difficult    General Information and Staff    Patient location during procedure: OR  RACHEAL/CAA: Mary Preston CRNA    Indications and Patient Condition  Indications for airway management: airway protection    Preoxygenated: yes  MILS not maintained throughout  Mask difficulty assessment: 1 - vent by mask    Final Airway Details  Final airway type: endotracheal airway      Successful airway: ETT  Cuffed: yes   Successful intubation technique: video laryngoscopy  Facilitating devices/methods: intubating stylet  Endotracheal tube insertion site: oral  Blade: [place]  Blade size: 3  ETT size (mm): 7.5  Cormack-Lehane Classification: grade I - full view of glottis  Placement verified by: chest auscultation and capnometry   Measured from: lips  ETT/EBT  to lips (cm): 22  Number of attempts at approach: 1  Assessment: lips, teeth, and gum same as pre-op and atraumatic intubation    Additional Comments  Negative epigastric sounds, Breath sound equal bilaterally with symmetric chest rise and fall             (0) No symptoms

## 2024-08-07 ENCOUNTER — TELEPHONE (OUTPATIENT)
Dept: UROLOGY | Facility: CLINIC | Age: 79
End: 2024-08-07
Payer: MEDICARE

## 2024-08-07 DIAGNOSIS — N40.1 BPH WITH OBSTRUCTION/LOWER URINARY TRACT SYMPTOMS: Primary | ICD-10-CM

## 2024-08-07 DIAGNOSIS — N13.8 BPH WITH OBSTRUCTION/LOWER URINARY TRACT SYMPTOMS: Primary | ICD-10-CM

## 2024-08-07 NOTE — TELEPHONE ENCOUNTER
Patient has been lost to urologic follow-up.  Please have him see me back with PSA and BMP prior to follow-up, within the next 4 to 6 weeks.  Thank you    Jose Mercado MD

## 2024-09-19 ENCOUNTER — LAB (OUTPATIENT)
Facility: HOSPITAL | Age: 79
End: 2024-09-19
Payer: MEDICARE

## 2024-09-19 DIAGNOSIS — N13.8 BPH WITH OBSTRUCTION/LOWER URINARY TRACT SYMPTOMS: ICD-10-CM

## 2024-09-19 DIAGNOSIS — N40.1 BPH WITH OBSTRUCTION/LOWER URINARY TRACT SYMPTOMS: ICD-10-CM

## 2024-09-19 PROCEDURE — 80048 BASIC METABOLIC PNL TOTAL CA: CPT

## 2024-09-19 PROCEDURE — 84153 ASSAY OF PSA TOTAL: CPT

## 2024-09-19 PROCEDURE — 36415 COLL VENOUS BLD VENIPUNCTURE: CPT

## 2024-09-20 LAB
ANION GAP SERPL CALCULATED.3IONS-SCNC: 12 MMOL/L (ref 5–15)
BUN SERPL-MCNC: 20 MG/DL (ref 8–23)
BUN/CREAT SERPL: 26 (ref 7–25)
CALCIUM SPEC-SCNC: 9.7 MG/DL (ref 8.6–10.5)
CHLORIDE SERPL-SCNC: 99 MMOL/L (ref 98–107)
CO2 SERPL-SCNC: 25 MMOL/L (ref 22–29)
CREAT SERPL-MCNC: 0.77 MG/DL (ref 0.76–1.27)
EGFRCR SERPLBLD CKD-EPI 2021: 90.5 ML/MIN/1.73
GLUCOSE SERPL-MCNC: 72 MG/DL (ref 65–99)
POTASSIUM SERPL-SCNC: 4.5 MMOL/L (ref 3.5–5.2)
PSA SERPL-MCNC: 0.52 NG/ML (ref 0–4)
SODIUM SERPL-SCNC: 136 MMOL/L (ref 136–145)

## 2024-09-26 ENCOUNTER — OFFICE VISIT (OUTPATIENT)
Dept: UROLOGY | Facility: CLINIC | Age: 79
End: 2024-09-26
Payer: MEDICARE

## 2024-09-26 VITALS — DIASTOLIC BLOOD PRESSURE: 93 MMHG | HEART RATE: 76 BPM | SYSTOLIC BLOOD PRESSURE: 167 MMHG

## 2024-09-26 DIAGNOSIS — N99.111 POSTPROCEDURAL BULBOUS URETHRAL STRICTURE: Primary | ICD-10-CM

## 2024-09-26 DIAGNOSIS — N13.8 BPH WITH OBSTRUCTION/LOWER URINARY TRACT SYMPTOMS: ICD-10-CM

## 2024-09-26 DIAGNOSIS — N40.1 BPH WITH OBSTRUCTION/LOWER URINARY TRACT SYMPTOMS: ICD-10-CM

## 2024-09-26 DIAGNOSIS — C61 PROSTATE CANCER: ICD-10-CM

## 2024-09-26 PROCEDURE — 51798 US URINE CAPACITY MEASURE: CPT | Performed by: STUDENT IN AN ORGANIZED HEALTH CARE EDUCATION/TRAINING PROGRAM

## 2024-09-26 PROCEDURE — 99214 OFFICE O/P EST MOD 30 MIN: CPT | Performed by: STUDENT IN AN ORGANIZED HEALTH CARE EDUCATION/TRAINING PROGRAM

## 2024-09-26 PROCEDURE — 1159F MED LIST DOCD IN RCRD: CPT | Performed by: STUDENT IN AN ORGANIZED HEALTH CARE EDUCATION/TRAINING PROGRAM

## 2024-09-26 PROCEDURE — 3080F DIAST BP >= 90 MM HG: CPT | Performed by: STUDENT IN AN ORGANIZED HEALTH CARE EDUCATION/TRAINING PROGRAM

## 2024-09-26 PROCEDURE — 1160F RVW MEDS BY RX/DR IN RCRD: CPT | Performed by: STUDENT IN AN ORGANIZED HEALTH CARE EDUCATION/TRAINING PROGRAM

## 2024-09-26 PROCEDURE — 3077F SYST BP >= 140 MM HG: CPT | Performed by: STUDENT IN AN ORGANIZED HEALTH CARE EDUCATION/TRAINING PROGRAM

## 2024-09-26 RX ORDER — MULTIVIT WITH MINERALS/LUTEIN
250 TABLET ORAL DAILY
COMMUNITY

## 2024-09-26 RX ORDER — ASPIRIN 325 MG
325 TABLET ORAL DAILY
COMMUNITY

## 2024-09-26 RX ORDER — UBIDECARENONE 75 MG
50 CAPSULE ORAL DAILY
COMMUNITY

## (undated) DEVICE — SOL ANTISTICK CAUTRY ELECTROLUBE LF

## (undated) DEVICE — SUT PDS MONO 0 36 CT1 VIL PDP346H

## (undated) DEVICE — VISUALIZATION SYSTEM: Brand: CLEARIFY

## (undated) DEVICE — SUT VIC 0 TIES 18IN J912G

## (undated) DEVICE — GLV SURG SENSICARE PI MIC PF SZ7.5 LF STRL

## (undated) DEVICE — COLUMN DRAPE

## (undated) DEVICE — PATIENT RETURN ELECTRODE, SINGLE-USE, CONTACT QUALITY MONITORING, ADULT, WITH 9FT CORD, FOR PATIENTS WEIGING OVER 33LBS. (15KG): Brand: MEGADYNE

## (undated) DEVICE — 3M™ IOBAN™ 2 ANTIMICROBIAL INCISE DRAPE 6650EZ: Brand: IOBAN™ 2

## (undated) DEVICE — ENDOPATH PNEUMONEEDLE INSUFFLATION NEEDLES WITH LUER LOCK CONNECTORS 120MM: Brand: ENDOPATH

## (undated) DEVICE — SUT PROLN 2/0 KS 30IN 8623H

## (undated) DEVICE — Device

## (undated) DEVICE — SUT VIC 0 UR6 27IN VCP603H

## (undated) DEVICE — ENDOPOUCH RETRIEVER SPECIMEN RETRIEVAL BAGS: Brand: ENDOPOUCH RETRIEVER

## (undated) DEVICE — CATH FOLEY LUBRICATH 3WY 22F 30CC

## (undated) DEVICE — ARM DRAPE

## (undated) DEVICE — SUT MNCRYL PLS ANTIB UD 4/0 PS2 18IN

## (undated) DEVICE — KT CLN CLEANOR SCPE

## (undated) DEVICE — APPL HEMOS FOR DELIVERY FLOSEAL

## (undated) DEVICE — DRSNG WND BORDR/ADHS NONADHR/GZ LF 4X4IN STRL

## (undated) DEVICE — Y-TYPE TUR/BLADDER IRRIGATION SET, REGULATING CLAMP

## (undated) DEVICE — BLADELESS OBTURATOR: Brand: WECK VISTA

## (undated) DEVICE — UNDERGLV SURG BIOGEL INDICAT PF 71/2 GRN

## (undated) DEVICE — ENDOPATH XCEL BLADELESS TROCARS WITH STABILITY SLEEVES: Brand: ENDOPATH XCEL

## (undated) DEVICE — ANTIBACTERIAL UNDYED BRAIDED (POLYGLACTIN 910), SYNTHETIC ABSORBABLE SUTURE: Brand: COATED VICRYL

## (undated) DEVICE — TIP COVER ACCESSORY

## (undated) DEVICE — LAP PORT CLOSURE GUIDES 5MM AND 10/12MM: Brand: LAP PORT CLOSURE GUIDES 5MM AND 10/12MM

## (undated) DEVICE — GOWN,NON-REINFORCED,SIRUS,SET IN SLV,XL: Brand: MEDLINE

## (undated) DEVICE — CYSTO/BLADDER IRRIGATION SET, REGULATING CLAMP

## (undated) DEVICE — BLANKT WARM UPPR/BDY ARM/OUT 57X196CM

## (undated) DEVICE — HLDR CATH FOLEY STATLOCK TRICOT PED 3WY

## (undated) DEVICE — CANNULA SEAL

## (undated) DEVICE — SUT SILK 2/0 PS 18IN 1588H

## (undated) DEVICE — DEV WARMR SCPE LIQUIDSCOPEWARMOR 2BUTN SHT NON/DEHP/ALC STRL

## (undated) DEVICE — PK UROL DAVINCI 10

## (undated) DEVICE — LEX PROSTATE ACCESSORY PACK: Brand: MEDLINE INDUSTRIES, INC.

## (undated) DEVICE — TBG PENCL TELESCP MEGADYNE SMOKE EVAC 10FT

## (undated) DEVICE — ST TBG CONN PNEUMOCLEAR EVAC SMOKE HEAT/HUMID

## (undated) DEVICE — SYR LUERLOK 30CC